# Patient Record
Sex: FEMALE | Race: BLACK OR AFRICAN AMERICAN | NOT HISPANIC OR LATINO | Employment: OTHER | URBAN - METROPOLITAN AREA
[De-identification: names, ages, dates, MRNs, and addresses within clinical notes are randomized per-mention and may not be internally consistent; named-entity substitution may affect disease eponyms.]

---

## 2017-10-24 ENCOUNTER — CONVERSION ENCOUNTER (OUTPATIENT)
Dept: MAMMOGRAPHY | Facility: HOSPITAL | Age: 77
End: 2017-10-24

## 2019-02-06 ENCOUNTER — HOSPITAL ENCOUNTER (OUTPATIENT)
Dept: MAMMOGRAPHY | Facility: HOSPITAL | Age: 79
Discharge: HOME | End: 2019-02-06
Attending: INTERNAL MEDICINE

## 2020-06-12 ENCOUNTER — HOSPITAL ENCOUNTER (OUTPATIENT)
Dept: MAMMOGRAPHY | Facility: HOSPITAL | Age: 80
Discharge: HOME | End: 2020-06-12
Attending: NURSE PRACTITIONER

## 2020-11-04 ENCOUNTER — CONVERSION ENCOUNTER (OUTPATIENT)
Dept: CARDIOLOGY | Facility: CLINIC | Age: 80
End: 2020-11-04

## 2020-11-04 ENCOUNTER — OFFICE VISIT CONVERTED (OUTPATIENT)
Dept: CARDIOLOGY | Facility: CLINIC | Age: 80
End: 2020-11-04
Attending: INTERNAL MEDICINE

## 2020-11-11 ENCOUNTER — HOSPITAL ENCOUNTER (OUTPATIENT)
Dept: ULTRASOUND IMAGING | Facility: HOSPITAL | Age: 80
Discharge: HOME | End: 2020-11-11
Attending: INTERNAL MEDICINE

## 2020-11-11 LAB
25(OH)D3 SERPL-MCNC: 38.6 NG/ML (ref 30–100)
ANION GAP SERPL CALC-SCNC: 14 MMOL/L (ref 8–19)
APPEARANCE UR: CLEAR
BASOPHILS # BLD AUTO: 0.03 10*3/UL (ref 0–0.2)
BASOPHILS NFR BLD AUTO: 0.6 % (ref 0–3)
BILIRUB UR QL: NEGATIVE
BUN SERPL-MCNC: 19 MG/DL (ref 5–25)
BUN/CREAT SERPL: 22 {RATIO} (ref 6–20)
CALCIUM SERPL-MCNC: 9.7 MG/DL (ref 8.7–10.4)
CHLORIDE SERPL-SCNC: 101 MMOL/L (ref 99–111)
COLOR UR: YELLOW
CONV ABS IMM GRAN: 0.01 10*3/UL (ref 0–0.2)
CONV BACTERIA: ABNORMAL
CONV CO2: 28 MMOL/L (ref 22–32)
CONV COLLECTION SOURCE (UA): ABNORMAL
CONV CREATININE URINE, RANDOM: 148.5 MG/DL (ref 10–300)
CONV HYALINE CASTS IN URINE MICRO: ABNORMAL /[LPF]
CONV IMMATURE GRAN: 0.2 % (ref 0–1.8)
CONV MICROALBUM.,U,RANDOM: 25.8 MG/L (ref 0–20)
CONV PROTEIN TO CREATININE RATIO (RANDOM URINE): 0.11 {RATIO} (ref 0–0.1)
CONV UROBILINOGEN IN URINE BY AUTOMATED TEST STRIP: 1 {EHRLICHU}/DL (ref 0.1–1)
CREAT UR-MCNC: 0.86 MG/DL (ref 0.5–0.9)
DEPRECATED RDW RBC AUTO: 40.2 FL (ref 36.4–46.3)
EOSINOPHIL # BLD AUTO: 0.21 10*3/UL (ref 0–0.7)
EOSINOPHIL # BLD AUTO: 4.1 % (ref 0–7)
ERYTHROCYTE [DISTWIDTH] IN BLOOD BY AUTOMATED COUNT: 12.7 % (ref 11.7–14.4)
EST. AVERAGE GLUCOSE BLD GHB EST-MCNC: 151 MG/DL
GFR SERPLBLD BASED ON 1.73 SQ M-ARVRAT: >60 ML/MIN/{1.73_M2}
GLUCOSE SERPL-MCNC: 118 MG/DL (ref 65–99)
GLUCOSE UR QL: NEGATIVE MG/DL
HBA1C MFR BLD: 6.9 % (ref 3.5–5.7)
HCT VFR BLD AUTO: 42.7 % (ref 37–47)
HGB BLD-MCNC: 14 G/DL (ref 12–16)
HGB UR QL STRIP: NEGATIVE
KETONES UR QL STRIP: NEGATIVE MG/DL
LEUKOCYTE ESTERASE UR QL STRIP: ABNORMAL
LYMPHOCYTES # BLD AUTO: 1.1 10*3/UL (ref 1–5)
LYMPHOCYTES NFR BLD AUTO: 21.4 % (ref 20–45)
MCH RBC QN AUTO: 28.6 PG (ref 27–31)
MCHC RBC AUTO-ENTMCNC: 32.8 G/DL (ref 33–37)
MCV RBC AUTO: 87.1 FL (ref 81–99)
MICROALBUMIN/CREAT UR: 17.4 MG/G{CRE} (ref 0–35)
MONOCYTES # BLD AUTO: 0.41 10*3/UL (ref 0.2–1.2)
MONOCYTES NFR BLD AUTO: 8 % (ref 3–10)
NEUTROPHILS # BLD AUTO: 3.37 10*3/UL (ref 2–8)
NEUTROPHILS NFR BLD AUTO: 65.7 % (ref 30–85)
NITRITE UR QL STRIP: NEGATIVE
NRBC CBCN: 0 % (ref 0–0.7)
OSMOLALITY SERPL CALC.SUM OF ELEC: 291 MOSM/KG (ref 273–304)
PH UR STRIP.AUTO: 7 [PH] (ref 5–8)
PHOSPHATE SERPL-MCNC: 2.9 MG/DL (ref 2.4–4.5)
PLATELET # BLD AUTO: 230 10*3/UL (ref 130–400)
PMV BLD AUTO: 10 FL (ref 9.4–12.3)
POTASSIUM SERPL-SCNC: 3.7 MMOL/L (ref 3.5–5.3)
PROT UR QL: NEGATIVE MG/DL
PROT UR-MCNC: 16 MG/DL
PTH-INTACT SERPL-MCNC: 23.5 PG/ML (ref 11.1–79.5)
RBC # BLD AUTO: 4.9 10*6/UL (ref 4.2–5.4)
RBC #/AREA URNS HPF: ABNORMAL /[HPF]
SODIUM SERPL-SCNC: 139 MMOL/L (ref 135–147)
SP GR UR: 1.02 (ref 1–1.03)
SQUAMOUS SPT QL MICRO: ABNORMAL /[HPF]
URATE SERPL-MCNC: 5.4 MG/DL (ref 2.5–7.5)
WBC # BLD AUTO: 5.13 10*3/UL (ref 4.8–10.8)
WBC #/AREA URNS HPF: ABNORMAL /[HPF]

## 2020-11-12 LAB
ALBUMIN SERPL-MCNC: 3.8 G/DL (ref 2.9–4.4)
ALBUMIN/GLOB SERPL: 1 {RATIO} (ref 0.7–1.7)
ALPHA2 GLOB SERPL ELPH-MCNC: 1 G/DL (ref 0.4–1)
BETA GLOBULIN: 1 G/DL (ref 0.7–1.3)
C3 SERPL-MCNC: 133 MG/DL (ref 82–167)
CH50 SERPL-ACNC: >60 U/ML
CONV ALPHA-1-GLOBULIN: 0.3 G/DL (ref 0–0.4)
CONV HEPATITIS B SURFACE AG W CONFIRMATION RE: NEGATIVE
CONV IMMUNOGLOBULIN G (IGG): 1411 MG/DL (ref 586–1602)
CONV IMMUNOGLOBULIN M (IGM): 209 MG/DL (ref 26–217)
CONV PE INTERPRETATION: NORMAL
CONV PE NOTE: NORMAL
CONV TOTAL PROTEIN: 7.5 G/DL (ref 6–8.5)
DSDNA AB SER-ACNC: NEGATIVE [IU]/ML
ENA AB SER IA-ACNC: NEGATIVE {RATIO}
FREE LIGHT CHAINS: 32.4 MG/L (ref 3.3–19.4)
GAMMA GLOB SERPL ELPH-MCNC: 1.5 G/DL (ref 0.4–1.8)
GLOBULIN UR ELPH-MCNC: 3.7 G/DL (ref 2.2–3.9)
HAV IGM SERPL QL IA: NEGATIVE
HBV CORE IGM SERPL QL IA: NEGATIVE
HCV AB SER DONR QL: <0.1 S/CO RATIO (ref 0–0.9)
IGA SERPL-MCNC: 181 MG/DL (ref 64–422)
KAPPA LC/LAMBDA SER: 1.88 {RATIO} (ref 0.26–1.65)
LAMBDA LC FREE SERPL-MCNC: 17.2 MG/L (ref 5.7–26.3)
M-SPIKE: NORMAL G/DL
PROT PATTERN SERPL IFE-IMP: NORMAL

## 2020-11-13 LAB — C4 SERPL-MCNC: 30 MG/DL (ref 12–38)

## 2020-11-25 ENCOUNTER — CONVERSION ENCOUNTER (OUTPATIENT)
Dept: CARDIOLOGY | Facility: CLINIC | Age: 80
End: 2020-11-25

## 2020-11-25 ENCOUNTER — OFFICE VISIT CONVERTED (OUTPATIENT)
Dept: CARDIOLOGY | Facility: CLINIC | Age: 80
End: 2020-11-25
Attending: INTERNAL MEDICINE

## 2020-12-04 ENCOUNTER — HOSPITAL ENCOUNTER (OUTPATIENT)
Dept: PREADMISSION TESTING | Facility: HOSPITAL | Age: 80
Discharge: HOME | End: 2020-12-04
Attending: INTERNAL MEDICINE

## 2020-12-06 LAB — SARS-COV-2 RNA SPEC QL NAA+PROBE: NOT DETECTED

## 2020-12-17 ENCOUNTER — HOSPITAL ENCOUNTER (OUTPATIENT)
Dept: CARDIOLOGY | Facility: HOSPITAL | Age: 80
Discharge: HOME | End: 2020-12-17
Attending: INTERNAL MEDICINE

## 2021-02-10 ENCOUNTER — HOSPITAL ENCOUNTER (OUTPATIENT)
Dept: VACCINE CLINIC | Facility: HOSPITAL | Age: 81
Discharge: HOME | End: 2021-02-10
Attending: INTERNAL MEDICINE

## 2021-03-11 ENCOUNTER — HOSPITAL ENCOUNTER (OUTPATIENT)
Dept: VACCINE CLINIC | Facility: HOSPITAL | Age: 81
Discharge: HOME | End: 2021-03-11
Attending: INTERNAL MEDICINE

## 2021-03-18 ENCOUNTER — HOSPITAL ENCOUNTER (OUTPATIENT)
Dept: OTHER | Facility: HOSPITAL | Age: 81
Discharge: HOME | End: 2021-03-18
Attending: INTERNAL MEDICINE

## 2021-03-18 LAB
ALBUMIN SERPL-MCNC: 4.2 G/DL (ref 3.5–5)
ALBUMIN/GLOB SERPL: 1.2 {RATIO} (ref 1.4–2.6)
ALP SERPL-CCNC: 68 U/L (ref 43–160)
ALT SERPL-CCNC: 27 U/L (ref 10–40)
ANION GAP SERPL CALC-SCNC: 14 MMOL/L (ref 8–19)
AST SERPL-CCNC: 27 U/L (ref 15–50)
BILIRUB SERPL-MCNC: 0.49 MG/DL (ref 0.2–1.3)
BUN SERPL-MCNC: 15 MG/DL (ref 5–25)
BUN/CREAT SERPL: 17 {RATIO} (ref 6–20)
CALCIUM SERPL-MCNC: 9.5 MG/DL (ref 8.7–10.4)
CHLORIDE SERPL-SCNC: 102 MMOL/L (ref 99–111)
CHOLEST SERPL-MCNC: 113 MG/DL (ref 107–200)
CHOLEST/HDLC SERPL: 2.1 {RATIO} (ref 3–6)
CONV CO2: 28 MMOL/L (ref 22–32)
CONV TOTAL PROTEIN: 7.7 G/DL (ref 6.3–8.2)
CREAT UR-MCNC: 0.87 MG/DL (ref 0.5–0.9)
GFR SERPLBLD BASED ON 1.73 SQ M-ARVRAT: >60 ML/MIN/{1.73_M2}
GLOBULIN UR ELPH-MCNC: 3.5 G/DL (ref 2–3.5)
GLUCOSE SERPL-MCNC: 118 MG/DL (ref 65–99)
HDLC SERPL-MCNC: 54 MG/DL (ref 40–60)
LDLC SERPL CALC-MCNC: 44 MG/DL (ref 70–100)
OSMOLALITY SERPL CALC.SUM OF ELEC: 292 MOSM/KG (ref 273–304)
POTASSIUM SERPL-SCNC: 3.9 MMOL/L (ref 3.5–5.3)
SODIUM SERPL-SCNC: 140 MMOL/L (ref 135–147)
TRIGL SERPL-MCNC: 73 MG/DL (ref 40–150)
VLDLC SERPL-MCNC: 15 MG/DL (ref 5–37)

## 2021-03-25 ENCOUNTER — OFFICE VISIT CONVERTED (OUTPATIENT)
Dept: CARDIOLOGY | Facility: CLINIC | Age: 81
End: 2021-03-25
Attending: INTERNAL MEDICINE

## 2021-05-10 NOTE — H&P
History and Physical      Patient Name: Rosalia Gilmore   Patient ID: [id number]   Sex: Female   YOB: 1940    Primary Care Provider: Chriss Liu MD    Visit Date: November 4, 2020    Provider: Keisha Darling MD   Location: [facility]   Location Address: [address] [place] [address], [address]   [place]  [id number]   Location Phone: [phone]          Chief Complaint     Evaluate hypertension.       History Of Present Illness  Consult requested by: Chriss Liu MD   Rosalia Gilmore is an 80 year old /Black female who has not been in the office in over 5.5 years now. She comes in because of increasing blood pressure. She denies any chest pain or pressure. No palpitations. She does have swelling, long-term, in her ankles, and is mild and unchanged. She does complain of shortness of breath when she goes up stairs, which she has to do frequency, and she thinks that is slightly new. She has occasional dizziness when her blood pressure goes too low, but no syncope, PND, or orthopnea.   PAST MEDICAL HISTORY: Arthritis; Diabetes mellitus; Heart murmur; Hypertension.   PAST HOSPITALIZATIONS/SURGERIES: Hysterectomy; Parotidectomy; Nasal polyps; Cataract removal bilateral eye.   FAMILY HISTORY: Positive for diabetes mellitus and hypertension. Negative for heart disease.   CURRENT MEDICATIONS: Hydrochlorothiazide 25 mg daily; Oscal with vitamin D 500 b.i.d.; Zocor 20 mg daily; Prilosec 20 mg daily; Glucophage  mg daily; aspirin 81 mg daily; carvedilol 12.5 mg one in the morning and two at night; Vesicare 5 mg daily; losartan 100 mg q. h.s.; clonidine 0.1 mg b.i.d. She started about two months ago at one tab and it was raised a couple of weeks ago by her nephrologist. She was also on both lisinopril and losartan, and the lisinopril was stopped a couple of weeks ago by the nephrologist. She also takes multivitamins.   CHOLESTEROL STATUS: Labs taken in August, BUN was 17, creatinine  "0.9, hemoglobin A1c 5.9%, total cholesterol 102, LDL 53, HDL 48, triglycerides 74.   PSYCHOSOCIAL HISTORY: Denies any changes in mood or depression. She is . Drinks one coffee and one tea a day for caffeine intake. Rarely has any alcohol. Never used any tobacco or tobacco products. She walks around the yard for exercise.   ALLERGIES: CODEINE; PENICILLIN.       Review of Systems  · Constitutional  o Admits  o : good general health lately  o Denies  o : fatigue, recent weight changes   · Eyes  o Denies  o : blurred vision  · HENT  o Denies  o : hearing loss or ringing, chronic sinus problem, swollen glands in neck  · Cardiovascular  o Admits  o : swelling (feet, ankles, hands), shortness of breath with activities  o Denies  o : chest pain, palpitations (fast, fluttering, or skipping beats)  · Respiratory  o Denies  o : shortness of breath, asthma or wheezing, COPD  · Gastrointestinal  o Denies  o : ulcers, nausea or vomiting  · Neurologic  o Admits  o : lightheaded or dizzy, headaches  o Denies  o : stroke  · Musculoskeletal  o Admits  o : joint pain, back pain  · Endocrine  o Admits  o : diabetes  o Denies  o : thyroid disease, heat or cold intolerance, excessive thirst or urination  · Heme-Lymph  o Denies  o : bleeding or bruising tendency, anemia      Vitals  Date Time BP Position Site L\R Cuff Size HR RR TEMP (F) WT  HT  BMI kg/m2 BSA m2 O2 Sat FR L/min FiO2 HC       11/04/2020 01:02 /86 Sitting    72 - R   175lbs 16oz 5'  8\" 26.76 1.96       11/04/2020 01:02 /76 Sitting                       Physical Examination  · Constitutional  o Appearance  o : Awake, alert, in no acute distress, somewhat hard-of-hearing.   · Eyes  o Conjunctivae  o : Conjunctivae normal.  o Pupils and Irises  o : Fundoscopic exam not done. Pupils equal and reactive to light.   · Ears, Nose, Mouth and Throat  o Oral Cavity  o :   § Oral Mucosa  § : Normal oral mucosa.  · Neck  o Inspection/Palpation  o : No JVD. No bruits " noted. No lymphadenopathy. Good carotid upstroke.  · Respiratory  o Respiratory  o : Good respiratory effort. Clear to percussion and auscultation.  · Cardiovascular  o Heart  o : PMI is normal. S1, S2 regular. No S3. Positive S4. 1-2/6 systolic murmur at the apex. Negative diastolic murmur.   o Peripheral Vascular System  o :   § Extremities  § : Good femoral and pedal pulses. No pedal edema.  · Gastrointestinal  o Abdominal Examination  o : Soft. No masses or tenderness felt. No hepatosplenomegaly. Abdominal aorta is not palpable.  · Musculoskeletal  o General  o : Muscle strength is normal with normal tone.  · Skin and Subcutaneous Tissue  o General Inspection  o : Within normal limits.  · EKG  o EKG  o : Performed in the office today.  o Indications  o : Uncontrolled hypertension.   o Results  o : She is in sinus rhythm at a rate of 70 with a right bundle branch block.   o Comparison  o : No recent EKG to compare to.           Assessment     1.  Hypertension, needs tighter control.  2.  Pedal edema, mild.  3.  Shortness of breath.  4.  Hyperlipidemia, at goal.  5.  Diabetes mellitus, controlled.            Plan     1.  Increase carvedilol to 12.5 mg two tabs b.i.d. with the current bottle, and when completed, she will go       to the 25 mg b.i.d. prescription.    2.  Take losartan in the morning.  3.  Decrease clonidine to 0.1 mg one tab at night.  4.  She will do a blood pressure log, starting next week, for two weeks, and we will adjust hypertensive        medications if needed.    5.  Follow up in 3-4 weeks with an echocardiogram.        CATY Denton/Keisha Darling MD, [facility]  JF:PM:vm                      Electronically Signed by: Jenn Smith-, Other -Author on November 10, 2020 06:40:29 AM  Electronically Co-signed by: CATY Johnson -Reviewer on November 10, 2020 08:37:58 AM  Electronically Co-signed by: Keisha Darling MD -Reviewer on November 14, 2020 10:18:59 AM

## 2021-05-10 NOTE — PROCEDURES
"   Procedure Note      Patient Name: Rosalia Gilmore   Patient ID: [id number]   Sex: Female   YOB: 1940    Primary Care Provider: Chriss Liu MD    Visit Date: November 25, 2020    Provider: Keisha Darling MD   Location: [facility]   Location Address: [address] [place] [address], [address]   [place]  [id number]   Location Phone: [phone]          FINAL REPORT   TRANSTHORACIC ECHOCARDIOGRAM REPORT    Diagnosis: Shortness of breath   Height: 5'8\" Weight: 176 B/P: 146/76 BSA: 1.9   Tech: BNS   MEASUREMENTS:  RVID (Diastole) : RVID. (NORMAL: 0.7 to 2.4 cm max)   LVID (Systole): 3.7 cm (Diastole): 5.0 cm . (NORMAL: 3.7 - 5.4 cm)   Posterior Wall Thickness (Diastole): 0.7 cm. (NORMAL: 0.8 - 1.1 cm)   Septal Thickness (Diastole): 1.3 cm. (NORMAL: 0.7 - 1.2 cm)   LAID (Systole): 4.0 cm. (NORMAL: 1.9 - 3.8 cm)   Aortic Root Diameter (Diastole): 3.4 cm. (NORMAL: 2.0 - 3.7 cm)   COMMENTS:  The patient underwent 2-D, M-Mode, and Doppler examination, including pulse-wave, continuous-wave, and color-flow analysis; the study is technically adequate.   FINDINGS:  MITRAL VALVE: Normal. E to F slope was normal. No evidence of any prolapse.   AORTIC VALVE: Normal with three cusps. Normal central closure. No evidence of any obstruction.   TRICUSPID VALVE: Normal.   PULMONIC VALVE: Normal.   LEFT ATRIUM: Normal; no masses seen. LA volume is 24 mL/m2.   AORTIC ROOT: Normal in size and motion.   LEFT VENTRICLE: The left ventricular chamber size is normal. The left ventricular wall thickness is increased with mild septal hypertrophy present. The overall left ventricular systolic function is normal with an estimated EF of 55-60%. No significant regional wall motion abnormalities are identified. There is a suggestion of a subvalvular small ventricular septal defect which appears to be a supracristal VSD.   RIGHT ATRIUM: Normal.   RIGHT VENTRICLE: Normal size and function.   PERICARDIUM: No effusion.   INFERIOR VENA " CAVA: Diameter is 1.9 cm with greater than 50% reduction with inspiration.   DOPPLER: Pulse-wave, continuous wave, and color-flow Doppler evaluation was performed. E/A ratio is 0.6. DT= 183 msec. IVRT is 88 msec. E/E' is 12. There is suggestion of flow between the left ventricle and right ventricle suggestive of a small subaortic VSD.   Faxed: 12/01/2020      CONCLUSION:  1.  Left ventricular chamber size is normal. The left ventricular wall thickness is increased with mild septal        hypertrophy present. The overall left ventricular systolic function is normal with an estimated EF of 55-60%.        No significant regional wall motion abnormalities are identified. There is a suggestion of a subvalvular small        ventricular septal defect which appears to be a supracristal VSD.  2.  Grade 1 left ventricular diastolic dysfunction.   3.  Suggestion of a subaortic small VSD.      Keisha Darling MD, FACC  PM/pap    This note was transcribed by Yu Gómez.  pap/pm  The above service was transcribed by Yu Gómez, and I attest to the accuracy of the note.  PM                 Electronically Signed by: Carmencita Gómez-, Other -Author on December 1, 2020 09:18:31 AM  Electronically Co-signed by: Keisha Darling MD -Reviewer on December 1, 2020 12:26:50 PM

## 2021-05-13 NOTE — PROGRESS NOTES
"   Progress Note      Patient Name: Rosalia Gilmore   Patient ID: [id number]   Sex: Female   YOB: 1940    Primary Care Provider: Chriss Liu MD    Visit Date: November 25, 2020    Provider: Keisha Darling MD   Location: [facility]   Location Address: [address] [place] [address], [address]   [place]  [id number]   Location Phone: [phone]          Chief Complaint  · Followup of hypertension and hyperlipidemia       History Of Present Illness  REFERRING PROVIDER: Chriss Liu MD   Rosalia Gilmore is a 80-year-old female with hypertension and hyperlipidemia who is feeling well. She denies any chest pain, shortness of breath, PND, orthopnea, palpitations, dizziness, or syncope.   PAST MEDICAL HISTORY: Arthritis; diabetes mellitus; heart murmur, hypertension.   PSYCHOSOCIAL HISTORY: Rare alcohol use.   CURRENT MEDICATIONS: HCTZ 25 mg daily; Os-Olvin/vitamin D 500 mg b.i.d.; Zocor 20 mg daily; Prilosec 20 mg daily; Glucophage 500 mg daily; aspirin 81 mg daily; Vesicare 5 mg daily; Losartan 100 mg daily; Clonidine 0.1 mg daily; Carvedilol 12.5 mg b.i.d.       Review of Systems  · Cardiovascular  o Denies  o : palpitations (fast, fluttering, or skipping beats), swelling (feet, ankles, hands), shortness of breath while walking or lying flat, chest pain or angina pectoris   · Respiratory  o Denies  o : chronic or frequent cough      Vitals  Date Time BP Position Site L\R Cuff Size HR RR TEMP (F) WT  HT  BMI kg/m2 BSA m2 O2 Sat FR L/min FiO2        11/25/2020 08:33 /90 Sitting    74 - R   173lbs 0oz 5'  8\" 26.3 1.94              Blood pressure log looks excellent.       Physical Examination  · Constitutional  o Appearance  o : Awake, alert, in no acute distress.  · Eyes  o Conjunctivae  o : Conjunctivae normal.  · Ears, Nose, Mouth and Throat  o Oral Cavity  o :   § Oral Mucosa  § : Normal.  · Neck  o Inspection/Palpation/Auscultation  o : No lymphadenopathy. No JVD. No bruit. Good carotid " upstroke.  · Respiratory  o Respiratory  o : Clear to percussion and auscultation. Good respiratory effort.  · Cardiovascular  o Heart  o : PMI is normal. S1, S2 regular. No S3. Positive S4. 1-2/6 systolic murmur at the apex. Negative diastolic murmur.  o Peripheral Vascular System  o :   § Extremities  § : Good femoral and pedal pulses. No pedal edema.  · Gastrointestinal  o Abdominal Examination  o : Soft. No masses or tenderness felt. No hepatosplenomegaly. Abdominal aorta is not palpable.  · Labs  o Labs  o : BMP is normal. HDL 59,LDL 46, triglyceride 84.     Echocardiogram results were reviewed with the patient, suggestive of a subaortic VSD.           Assessment     1.  Hypertension, controlled.   2.  Hyperlipidemia, at goal.   3.  High likelihood of subaortic VSD.       Plan     Discussed with her options regarding MRI of the heart to determine congenital abnormality that we have seen on her echocardiogram.  However, she does not want to go to [place]. I am therefore going to schedule her for a transesophageal echocardiogram.  Indications and risks of the procedure have been explained to the patient.       Keisha Darling MD, [facility]  PM/pap    This note was transcribed by Yu Gómez.  pap/pm  The above service was transcribed by Yu Gómez, and I attest to the accuracy of the note.  PM             Electronically Signed by: Carmencita Gómez-, Other -Author on December 1, 2020 02:51:07 PM  Electronically Co-signed by: Keisha Darling MD -Reviewer on December 2, 2020 08:51:30 AM

## 2021-05-14 VITALS
DIASTOLIC BLOOD PRESSURE: 82 MMHG | HEART RATE: 72 BPM | WEIGHT: 168 LBS | BODY MASS INDEX: 25.46 KG/M2 | HEIGHT: 68 IN | SYSTOLIC BLOOD PRESSURE: 176 MMHG

## 2021-05-14 VITALS
DIASTOLIC BLOOD PRESSURE: 90 MMHG | HEIGHT: 68 IN | HEART RATE: 74 BPM | WEIGHT: 173 LBS | SYSTOLIC BLOOD PRESSURE: 174 MMHG | BODY MASS INDEX: 26.22 KG/M2

## 2021-05-14 VITALS
BODY MASS INDEX: 26.67 KG/M2 | HEIGHT: 68 IN | SYSTOLIC BLOOD PRESSURE: 158 MMHG | HEART RATE: 72 BPM | WEIGHT: 176 LBS | DIASTOLIC BLOOD PRESSURE: 86 MMHG

## 2021-05-14 NOTE — PROGRESS NOTES
"   Progress Note      Patient Name: Rosalia Gilmore   Patient ID: [id number]   Sex: Female   YOB: 1940    Primary Care Provider: Chriss Liu MD    Visit Date: March 25, 2021    Provider: Keisha Darling MD   Location: [facility]   Location Address: [address] [place] [address], [address]   [place]  [id number]   Location Phone: [phone]          Chief Complaint     Follow up of hypertension and abnormal echocardiogram.       History Of Present Illness  REFERRING PROVIDER: Chriss Liu MD   Rosalia Gilmore is an 80 year old /Black female with hypertension, hyperlipidemia, and suspicion for possible VSD who is back for a followup. She feels very well. She denies any chest pain, palpitations, shortness of breath, dizziness, or syncope.   PAST MEDICAL HISTORY: Arthritis; Diabetes mellitus; Heart murmur; Hypertension.   PSYCHOSOCIAL HISTORY: Denies tobacco use. Rarely consumes alcohol.   CURRENT MEDICATIONS: Medication list reviewed and as documented.      ALLERGIES: Codeine; penicillin.       Review of Systems  · Cardiovascular  o Denies  o : palpitations (fast, fluttering, or skipping beats), swelling (feet, ankles, hands), shortness of breath while walking or lying flat, chest pain or angina pectoris   · Respiratory  o Denies  o : chronic or frequent cough      Vitals  Date Time BP Position Site L\R Cuff Size HR RR TEMP (F) WT  HT  BMI kg/m2 BSA m2 O2 Sat FR L/min FiO2 HC       03/25/2021 10:47 /82 Sitting    72 - R   168lbs 0oz 5'  8\" 25.54 1.91       03/25/2021 10:47 /78 Sitting                        Her blood pressure log looks much better than the office readings.       Physical Examination  · Constitutional  o Appearance  o : Awake, alert, in no acute distress.  · Respiratory  o Respiratory  o : Clear to percussion and auscultation. Good respiratory effort.  · Cardiovascular  o Heart  o : PMI is normal. S1, S2 regular. No S3. Positive S4. 1-2/6 systolic murmur " at the apex. Negative diastolic murmur.   o Peripheral Vascular System  o :   § Extremities  § : Good femoral and pedal pulses. No pedal edema.  · Gastrointestinal  o Abdominal Examination  o : Soft. No masses or tenderness felt. No hepatosplenomegaly. Abdominal aorta is not palpable.  · Labs  o Labs  o : Chemistry panel is normal. HDL 54, LDL 44, triglycerides 73.          Assessment     1.  Hypertension, controlled based on home blood pressure readings.  2.  Hyperlipidemia, at goal.  3.  Diabetes mellitus, type 2 without complication.  4.  No evidence of VSD on transesophageal echocardiogram.       Plan     1.  Continue current medications.  2.  Continue home blood pressure monitoring.  3.  Follow up in 1 year or earlier if necessary.        Keisha Darling MD, [facility]  PM:vm             Electronically Signed by: Jenn Smith-, Other -Author on April 7, 2021 08:27:51 PM  Electronically Co-signed by: Keisha Darling MD -Reviewer on April 20, 2021 12:45:15 PM

## 2021-05-24 ENCOUNTER — TRANSCRIBE ORDERS (OUTPATIENT)
Dept: ADMINISTRATIVE | Facility: HOSPITAL | Age: 81
End: 2021-05-24

## 2021-05-24 DIAGNOSIS — Z12.39 SCREENING BREAST EXAMINATION: Primary | ICD-10-CM

## 2021-06-01 ENCOUNTER — HOSPITAL ENCOUNTER (OUTPATIENT)
Dept: MAMMOGRAPHY | Facility: HOSPITAL | Age: 81
Discharge: HOME | End: 2021-06-01
Attending: INTERNAL MEDICINE

## 2021-06-22 ENCOUNTER — HOSPITAL ENCOUNTER (OUTPATIENT)
Dept: MAMMOGRAPHY | Facility: HOSPITAL | Age: 81
Discharge: HOME | End: 2021-06-22
Admitting: INTERNAL MEDICINE

## 2021-06-22 DIAGNOSIS — Z12.39 SCREENING BREAST EXAMINATION: ICD-10-CM

## 2021-06-22 PROCEDURE — 77067 SCR MAMMO BI INCL CAD: CPT | Performed by: RADIOLOGY

## 2021-06-22 PROCEDURE — 77067 SCR MAMMO BI INCL CAD: CPT

## 2021-06-22 PROCEDURE — 77063 BREAST TOMOSYNTHESIS BI: CPT

## 2021-06-22 PROCEDURE — 77063 BREAST TOMOSYNTHESIS BI: CPT | Performed by: RADIOLOGY

## 2021-07-23 ENCOUNTER — APPOINTMENT (OUTPATIENT)
Dept: CT IMAGING | Facility: HOSPITAL | Age: 81
End: 2021-07-23

## 2021-07-23 ENCOUNTER — HOSPITAL ENCOUNTER (EMERGENCY)
Facility: HOSPITAL | Age: 81
Discharge: HOME | End: 2021-07-23
Attending: EMERGENCY MEDICINE | Admitting: EMERGENCY MEDICINE

## 2021-07-23 VITALS
DIASTOLIC BLOOD PRESSURE: 93 MMHG | HEIGHT: 63 IN | RESPIRATION RATE: 17 BRPM | HEART RATE: 81 BPM | SYSTOLIC BLOOD PRESSURE: 176 MMHG | BODY MASS INDEX: 29.88 KG/M2 | WEIGHT: 168.65 LBS | TEMPERATURE: 97.2 F | OXYGEN SATURATION: 98 %

## 2021-07-23 DIAGNOSIS — S00.93XA TRAUMATIC HEMATOMA OF HEAD, INITIAL ENCOUNTER: ICD-10-CM
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
## 2021-07-23 PROCEDURE — 70450 CT HEAD/BRAIN W/O DYE: CPT

## 2021-07-23 PROCEDURE — 99283 EMERGENCY DEPT VISIT LOW MDM: CPT

## 2021-07-23 RX ORDER — ACETAMINOPHEN 325 MG/1
975 TABLET ORAL ONCE
Status: COMPLETED | OUTPATIENT
Start: 2021-07-23 | End: 2021-07-23

## 2021-07-23 RX ADMIN — ACETAMINOPHEN 975 MG: 325 TABLET ORAL at 16:31

## 2021-07-23 NOTE — ED PROVIDER NOTES
Subjective     History provided by:  Patient   used: No    Fall  Mechanism of injury: fall    Injury location:  Head/neck  Head/neck injury location:  Head  Incident location: tripped in [facility] parking lot.  Time since incident:  30 minutes  Arrived directly from scene: yes    Fall:     Fall occurred:  Tripped    Point of impact:  Head      Review of Systems   Constitutional: Negative for appetite change, chills, fatigue and fever.   HENT: Negative.    Eyes: Negative.  Negative for photophobia.   Respiratory: Negative.    Gastrointestinal: Negative.    Endocrine: Negative.    Genitourinary: Negative.    Musculoskeletal: Negative.    Skin: Negative.    Allergic/Immunologic: Negative.  Negative for immunocompromised state.   Neurological: Negative.    Hematological: Negative.    Psychiatric/Behavioral: Negative.    All other systems reviewed and are negative.      History reviewed. No pertinent past medical history.    Allergies   Allergen Reactions   • Penicillins Anaphylaxis   • Codeine Itching       History reviewed. No pertinent surgical history.    History reviewed. No pertinent family history.    Social History     Socioeconomic History   • Marital status:      Spouse name: Not on file   • Number of children: Not on file   • Years of education: Not on file   • Highest education level: Not on file           Objective   Physical Exam  Vitals and nursing note reviewed.   Constitutional:       General: She is not in acute distress.     Appearance: Normal appearance. She is not toxic-appearing.   HENT:      Head: Normocephalic. Abrasion present. No raccoon eyes or Mills's sign.        Comments: No cervical spinous process tenderness     Mouth/Throat:      Mouth: Mucous membranes are moist.   Eyes:      Extraocular Movements: Extraocular movements intact.      Pupils: Pupils are equal, round, and reactive to light.   Cardiovascular:      Rate and Rhythm: Normal rate and regular rhythm.       Pulses: Normal pulses.      Heart sounds: Normal heart sounds.   Pulmonary:      Effort: Pulmonary effort is normal. No respiratory distress.      Breath sounds: Normal breath sounds.   Abdominal:      General: Abdomen is flat.      Palpations: Abdomen is soft.      Tenderness: There is no abdominal tenderness.   Musculoskeletal:         General: Normal range of motion.      Cervical back: Normal range of motion and neck supple.        Feet:    Skin:     General: Skin is warm and dry.   Neurological:      General: No focal deficit present.      Mental Status: She is alert and oriented to person, place, and time. Mental status is at baseline.   Psychiatric:         Mood and Affect: Mood normal.         Behavior: Behavior normal.         Thought Content: Thought content normal.         Judgment: Judgment normal.         Procedures           ED Course                                           MDM  Number of Diagnoses or Management Options  Diagnosis management comments: Pt is a 80 y.o. female that presents today with Patient presents with:  Fall       Work up today:  Lab Results (last 24 hours)     ** No results found for the last 24 hours. **      CT Head Without Contrast    Result Date: 7/23/2021  PROCEDURE: CT HEAD WO CONTRAST  COMPARISON:  [facility], CT, HEAD W/O CONTRAST, 4/27/2014, 11:32. INDICATIONS: fall hit head  PROTOCOL:   Standard imaging protocol performed    RADIATION:      MA and/or KV was adjusted to minimize radiation dose.     TECHNIQUE: After obtaining the patient's consent, CT images were obtained without non-ionic intravenous contrast material.  FINDINGS:  There are white matter changes involving both cerebral hemispheres which could reflect more chronic small vessel ischemic change.  There is cerebellar atrophy.  There is a scalp hematoma along the calvarium in the frontal area measuring 3.4 x 1.6 cm.  There is no definite orbital abnormality.  The paranasal sinuses seem clear.  The  patient has had bilateral maxillary antrostomies.  CONCLUSION:  1. White matter changes which could reflect more chronic small vessel ischemic change. 2. Large scalp hematoma along the calvarium in the frontal area 3. There is some cerebellar atrophy.     ULISES BARBA MD       Electronically Signed and Approved By: ULISES BARBA MD on 7/23/2021 at 16:04              @No orders to display     Pt is otherwise non toxic and non ill appearing and stable for d/c home.  Pt is in agreement with this.  All questions answered at bedside.          Amount and/or Complexity of Data Reviewed  Tests in the radiology section of CPT®: reviewed    Risk of Complications, Morbidity, and/or Mortality  Presenting problems: low  Diagnostic procedures: low  Management options: low    Patient Progress  Patient progress: stable      Final diagnoses:   Fall, initial encounter   Traumatic hematoma of head, initial encounter       ED Disposition  ED Disposition     ED Disposition Condition Comment    Discharge Stable           Chriss Liu MD  [address] [facility] [address]  [facility] [address]  [phone]    Schedule an appointment as soon as possible for a visit   If symptoms worsen         Medication List      No changes were made to your prescriptions during this visit.          Corwin Perez PA  07/23/21 [id number]

## 2021-07-23 NOTE — DISCHARGE INSTRUCTIONS
May also ice your head every 4 hours for 15 minutes without letting ice touch skin directly for next 2 days  Tylenol for pain as directed on box

## 2021-08-10 ENCOUNTER — TRANSCRIBE ORDERS (OUTPATIENT)
Dept: ADMINISTRATIVE | Facility: HOSPITAL | Age: 81
End: 2021-08-10

## 2021-08-10 DIAGNOSIS — R92.8 ABNORMAL MAMMOGRAM: Primary | ICD-10-CM

## 2021-09-24 ENCOUNTER — HOSPITAL ENCOUNTER (OUTPATIENT)
Dept: ULTRASOUND IMAGING | Facility: HOSPITAL | Age: 81
Discharge: HOME | End: 2021-09-24

## 2021-09-24 ENCOUNTER — HOSPITAL ENCOUNTER (OUTPATIENT)
Dept: MAMMOGRAPHY | Facility: HOSPITAL | Age: 81
Discharge: HOME | End: 2021-09-24

## 2021-09-24 DIAGNOSIS — R92.8 ABNORMAL MAMMOGRAM: ICD-10-CM

## 2021-09-24 PROCEDURE — 77066 DX MAMMO INCL CAD BI: CPT

## 2021-09-24 PROCEDURE — G0279 TOMOSYNTHESIS, MAMMO: HCPCS

## 2022-03-25 ENCOUNTER — LAB (OUTPATIENT)
Dept: LAB | Facility: HOSPITAL | Age: 82
End: 2022-03-25

## 2022-03-25 ENCOUNTER — TRANSCRIBE ORDERS (OUTPATIENT)
Dept: ADMINISTRATIVE | Facility: HOSPITAL | Age: 82
End: 2022-03-25

## 2022-03-25 DIAGNOSIS — Z79.899 ENCOUNTER FOR LONG-TERM (CURRENT) USE OF OTHER MEDICATIONS: ICD-10-CM

## 2022-03-25 DIAGNOSIS — E78.5 HYPERLIPIDEMIA, UNSPECIFIED HYPERLIPIDEMIA TYPE: Primary | ICD-10-CM

## 2022-03-25 DIAGNOSIS — E78.5 HYPERLIPIDEMIA, UNSPECIFIED HYPERLIPIDEMIA TYPE: ICD-10-CM

## 2022-03-25 LAB
ALBUMIN SERPL-MCNC: 4.6 G/DL (ref 3.5–5.2)
ALBUMIN/GLOB SERPL: 1.5 G/DL
ALP SERPL-CCNC: 56 U/L (ref 39–117)
ALT SERPL W P-5'-P-CCNC: 19 U/L (ref 1–33)
ANION GAP SERPL CALCULATED.3IONS-SCNC: 11.4 MMOL/L (ref 5–15)
AST SERPL-CCNC: 19 U/L (ref 1–32)
BILIRUB SERPL-MCNC: 0.7 MG/DL (ref 0–1.2)
BUN SERPL-MCNC: 16 MG/DL (ref 8–23)
BUN/CREAT SERPL: 18.4 (ref 7–25)
CALCIUM SPEC-SCNC: 9.9 MG/DL (ref 8.6–10.5)
CHLORIDE SERPL-SCNC: 102 MMOL/L (ref 98–107)
CHOLEST SERPL-MCNC: 119 MG/DL (ref 0–200)
CO2 SERPL-SCNC: 28.6 MMOL/L (ref 22–29)
CREAT SERPL-MCNC: 0.87 MG/DL (ref 0.57–1)
EGFRCR SERPLBLD CKD-EPI 2021: 67 ML/MIN/1.73
GLOBULIN UR ELPH-MCNC: 3 GM/DL
GLUCOSE SERPL-MCNC: 117 MG/DL (ref 65–99)
HDLC SERPL-MCNC: 60 MG/DL (ref 40–60)
LDLC SERPL CALC-MCNC: 46 MG/DL (ref 0–100)
LDLC/HDLC SERPL: 0.8 {RATIO}
POTASSIUM SERPL-SCNC: 4.1 MMOL/L (ref 3.5–5.2)
PROT SERPL-MCNC: 7.6 G/DL (ref 6–8.5)
SODIUM SERPL-SCNC: 142 MMOL/L (ref 136–145)
T4 FREE SERPL-MCNC: 1.26 NG/DL (ref 0.93–1.7)
TRIGL SERPL-MCNC: 56 MG/DL (ref 0–150)
TSH SERPL DL<=0.05 MIU/L-ACNC: 1.88 UIU/ML (ref 0.27–4.2)
VLDLC SERPL-MCNC: 13 MG/DL (ref 5–40)

## 2022-03-25 PROCEDURE — 80061 LIPID PANEL: CPT

## 2022-03-25 PROCEDURE — 84443 ASSAY THYROID STIM HORMONE: CPT

## 2022-03-25 PROCEDURE — 80053 COMPREHEN METABOLIC PANEL: CPT

## 2022-03-25 PROCEDURE — 84439 ASSAY OF FREE THYROXINE: CPT

## 2022-03-25 PROCEDURE — 36415 COLL VENOUS BLD VENIPUNCTURE: CPT

## 2022-03-28 PROBLEM — I10 ESSENTIAL HYPERTENSION: Chronic | Status: ACTIVE | Noted: 2022-03-28

## 2022-03-28 PROBLEM — E78.5 HYPERLIPIDEMIA: Chronic | Status: ACTIVE | Noted: 2022-03-28

## 2022-03-28 PROBLEM — I10 ESSENTIAL HYPERTENSION: Status: ACTIVE | Noted: 2022-03-28

## 2022-03-28 PROBLEM — E78.5 HYPERLIPIDEMIA: Status: ACTIVE | Noted: 2022-03-28

## 2022-03-31 ENCOUNTER — TRANSCRIBE ORDERS (OUTPATIENT)
Dept: CARDIOLOGY | Facility: CLINIC | Age: 82
End: 2022-03-31

## 2022-03-31 DIAGNOSIS — Z79.899 ENCOUNTER FOR LONG-TERM (CURRENT) USE OF OTHER MEDICATIONS: ICD-10-CM

## 2022-03-31 DIAGNOSIS — E78.5 HYPERLIPIDEMIA, UNSPECIFIED HYPERLIPIDEMIA TYPE: Primary | ICD-10-CM

## 2022-04-01 ENCOUNTER — OFFICE VISIT (OUTPATIENT)
Dept: CARDIOLOGY | Facility: CLINIC | Age: 82
End: 2022-04-01

## 2022-04-01 VITALS
BODY MASS INDEX: 28.7 KG/M2 | HEART RATE: 77 BPM | HEIGHT: 63 IN | WEIGHT: 162 LBS | SYSTOLIC BLOOD PRESSURE: 146 MMHG | DIASTOLIC BLOOD PRESSURE: 77 MMHG

## 2022-04-01 DIAGNOSIS — I10 ESSENTIAL HYPERTENSION: Primary | Chronic | ICD-10-CM

## 2022-04-01 DIAGNOSIS — E78.5 HYPERLIPIDEMIA, UNSPECIFIED HYPERLIPIDEMIA TYPE: Chronic | ICD-10-CM

## 2022-04-01 PROCEDURE — 99214 OFFICE O/P EST MOD 30 MIN: CPT | Performed by: NURSE PRACTITIONER

## 2022-04-01 RX ORDER — HYDROCHLOROTHIAZIDE 25 MG/1
TABLET ORAL
COMMUNITY
Start: 2022-03-08

## 2022-04-01 RX ORDER — CARVEDILOL 25 MG/1
25 TABLET ORAL 2 TIMES DAILY WITH MEALS
COMMUNITY
Start: 2022-01-31

## 2022-04-01 RX ORDER — UREA 10 %
LOTION (ML) TOPICAL
COMMUNITY

## 2022-04-01 RX ORDER — OMEPRAZOLE 20 MG/1
20 CAPSULE, DELAYED RELEASE ORAL DAILY
COMMUNITY
Start: 2022-02-09

## 2022-04-01 RX ORDER — SIMVASTATIN 20 MG
20 TABLET ORAL NIGHTLY
COMMUNITY
Start: 2022-02-09

## 2022-04-01 RX ORDER — ASPIRIN 81 MG/1
TABLET, COATED ORAL
COMMUNITY
Start: 2022-01-31

## 2022-04-01 RX ORDER — METFORMIN HYDROCHLORIDE 500 MG/1
TABLET, EXTENDED RELEASE ORAL EVERY 24 HOURS
COMMUNITY

## 2022-04-01 RX ORDER — CLONIDINE HYDROCHLORIDE 0.1 MG/1
0.1 TABLET ORAL 2 TIMES DAILY
COMMUNITY

## 2022-04-01 RX ORDER — SOLIFENACIN SUCCINATE 5 MG/1
TABLET, FILM COATED ORAL EVERY 24 HOURS
COMMUNITY
End: 2022-04-01

## 2022-04-01 NOTE — PROGRESS NOTES
Chief Complaint  Hypertension and Hyperlipidemia (1 year follow up)    Subjective        Rosalia Gilmore presents to [facility] CARDIOLOGY  He is an 81-year-old female comes in to evaluate her hypertension and hyperlipidemia. Denies any chest pains, shortness of breath, palpitations, dizziness, syncope, swelling, PND, or orthopnea.  Cardiac wise she has no complaints.  She monitors her blood pressures at home states they read between 120-135 systolically.  Overall she is feeling good with no complaints.        Past History:    Past Medical History:   Diagnosis Date   • Essential hypertension 3/28/2022   • Hyperlipidemia 3/28/2022        Family History: family history is not on file.     Social History: reports that she has never smoked. She has never used smokeless tobacco. She reports that she does not drink alcohol and does not use drugs.    Allergies: Penicillins and Codeine    History reviewed. No pertinent surgical history.       Current Outpatient Medications:   •  Aspirin Low Dose 81 MG EC tablet, , Disp: , Rfl:   •  calcium carbonate (OS-LISSETTE) 1250 (500 Ca) MG chewable tablet, 1 tab(s), Disp: , Rfl:   •  carvedilol (COREG) 25 MG tablet, Take 25 mg by mouth 2 (Two) Times a Day With Meals., Disp: , Rfl:   •  cloNIDine (CATAPRES) 0.1 MG tablet, Take 0.1 mg by mouth 2 (Two) Times a Day., Disp: , Rfl:   •  hydroCHLOROthiazide (HYDRODIURIL) 25 MG tablet, , Disp: , Rfl:   •  metFORMIN ER (GLUCOPHAGE-XR) 500 MG 24 hr tablet, Daily., Disp: , Rfl:   •  omeprazole (priLOSEC) 20 MG capsule, Take 20 mg by mouth Daily., Disp: , Rfl:   •  simvastatin (ZOCOR) 20 MG tablet, Take 20 mg by mouth Every Night., Disp: , Rfl:      Medications Discontinued During This Encounter   Medication Reason   • solifenacin (VESICARE) 5 MG tablet *Therapy completed        Review of Systems   Respiratory: Negative for chest tightness and shortness of breath.    Cardiovascular: Negative for chest pain, palpitations and leg  "swelling.   Neurological: Negative for dizziness, syncope, weakness, light-headedness and numbness.   All other systems reviewed and are negative.       Objective     Physical Exam  Constitutional:       General: She is not in acute distress.  Neck:      Vascular: No carotid bruit.   Cardiovascular:      Rate and Rhythm: Normal rate and regular rhythm.      Heart sounds: Murmur (1/6 to 2/6 systolic murmur at the apex) heard.   Pulmonary:      Effort: Pulmonary effort is normal.      Breath sounds: Normal breath sounds.   Musculoskeletal:      Right lower leg: No edema.      Left lower leg: No edema.   Neurological:      Mental Status: She is alert.       /77 (BP Location: Right arm, Patient Position: Sitting)   Pulse 77   Ht 160 cm (63\")   Wt 73.5 kg (162 lb)   BMI 28.70 kg/m²       Vitals:    04/01/22 1031   BP: 146/77   Pulse: 77       Result Review :         The following data was reviewed by: CATY Whiteside on 04/01/2022:        CMP    CMP 3/25/22   Glucose 117 (A)   BUN 16   Creatinine 0.87   Sodium 142   Potassium 4.1   Chloride 102   Calcium 9.9   Albumin 4.60   Total Bilirubin 0.7   Alkaline Phosphatase 56   AST (SGOT) 19   ALT (SGPT) 19   (A) Abnormal value               Lipid Panel    Lipid Panel 3/25/22   Total Cholesterol 119   Triglycerides 56   HDL Cholesterol 60   VLDL Cholesterol 13   LDL Cholesterol  46   LDL/HDL Ratio 0.80            Lab Results   Component Value Date    TSH 1.880 03/25/2022      Lab Results   Component Value Date    FREET4 1.26 03/25/2022              Assessment and Plan    Diagnoses and all orders for this visit:    1. Essential hypertension (Primary)  Assessment & Plan:  Controlled.  Continue hydrochlorothiazide 25 mg, clonidine 0.1 mg twice daily and carvedilol 25 mg twice daily      2. Hyperlipidemia, unspecified hyperlipidemia type  Assessment & Plan:  Well-controlled.  Continue simvastatin 20 mg.    Orders:  -     Lipid Panel; Future  -     Comprehensive " Metabolic Panel; Future          Follow Up     Return in about 6 months (around 10/1/2022) for with Dr. Darling.    Patient was given instructions and counseling regarding her condition or for health maintenance advice. Please see specific information pulled into the AVS if appropriate.       CATY Denton  04/01/22 10:38 EDT

## 2022-04-01 NOTE — ASSESSMENT & PLAN NOTE
Controlled.  Continue hydrochlorothiazide 25 mg, clonidine 0.1 mg twice daily and carvedilol 25 mg twice daily

## 2022-09-19 ENCOUNTER — TRANSCRIBE ORDERS (OUTPATIENT)
Dept: ADMINISTRATIVE | Facility: HOSPITAL | Age: 82
End: 2022-09-19

## 2022-09-19 DIAGNOSIS — Z12.31 SCREENING MAMMOGRAM, ENCOUNTER FOR: Primary | ICD-10-CM

## 2022-10-21 ENCOUNTER — OFFICE VISIT (OUTPATIENT)
Dept: CARDIOLOGY | Facility: CLINIC | Age: 82
End: 2022-10-21

## 2022-10-21 VITALS
BODY MASS INDEX: 28.53 KG/M2 | HEIGHT: 63 IN | SYSTOLIC BLOOD PRESSURE: 157 MMHG | HEART RATE: 72 BPM | DIASTOLIC BLOOD PRESSURE: 83 MMHG | WEIGHT: 161 LBS

## 2022-10-21 DIAGNOSIS — E78.5 HYPERLIPIDEMIA LDL GOAL <70: ICD-10-CM

## 2022-10-21 DIAGNOSIS — I10 ESSENTIAL HYPERTENSION: Primary | ICD-10-CM

## 2022-10-21 PROCEDURE — 99213 OFFICE O/P EST LOW 20 MIN: CPT | Performed by: INTERNAL MEDICINE

## 2022-11-10 ENCOUNTER — APPOINTMENT (OUTPATIENT)
Dept: MAMMOGRAPHY | Facility: HOSPITAL | Age: 82
End: 2022-11-10

## 2022-11-18 ENCOUNTER — HOSPITAL ENCOUNTER (OUTPATIENT)
Dept: MAMMOGRAPHY | Facility: HOSPITAL | Age: 82
Discharge: HOME | End: 2022-11-18
Admitting: INTERNAL MEDICINE

## 2022-11-18 DIAGNOSIS — Z12.31 SCREENING MAMMOGRAM, ENCOUNTER FOR: ICD-10-CM

## 2022-11-18 PROCEDURE — 77067 SCR MAMMO BI INCL CAD: CPT

## 2022-11-18 PROCEDURE — 77063 BREAST TOMOSYNTHESIS BI: CPT

## 2023-04-21 PROBLEM — E11.9 TYPE 2 DIABETES MELLITUS: Status: ACTIVE | Noted: 2023-04-21

## 2023-04-21 PROBLEM — E55.9 VITAMIN D DEFICIENCY: Status: ACTIVE | Noted: 2023-04-21

## 2023-04-21 PROBLEM — R55 SYNCOPE AND COLLAPSE: Status: ACTIVE | Noted: 2023-04-21

## 2023-04-21 PROBLEM — N32.81 OAB (OVERACTIVE BLADDER): Status: ACTIVE | Noted: 2023-04-21

## 2023-04-21 PROBLEM — K21.9 CHRONIC GERD: Status: ACTIVE | Noted: 2023-04-21

## 2023-04-21 RX ORDER — LOSARTAN POTASSIUM 100 MG/1
100 TABLET ORAL DAILY
COMMUNITY

## 2023-04-21 RX ORDER — CELECOXIB 200 MG/1
200 CAPSULE ORAL DAILY
COMMUNITY

## 2023-04-21 RX ORDER — TRAMADOL HYDROCHLORIDE 50 MG/1
50 TABLET ORAL EVERY 6 HOURS PRN
COMMUNITY

## 2023-04-26 ENCOUNTER — OFFICE VISIT (OUTPATIENT)
Dept: UROLOGY | Facility: CLINIC | Age: 83
End: 2023-04-26
Payer: MEDICARE

## 2023-04-26 VITALS — RESPIRATION RATE: 16 BRPM | BODY MASS INDEX: 23.07 KG/M2 | WEIGHT: 152.2 LBS | HEIGHT: 68 IN

## 2023-04-26 DIAGNOSIS — R35.1 NOCTURIA: ICD-10-CM

## 2023-04-26 DIAGNOSIS — N39.41 URGE INCONTINENCE: Primary | ICD-10-CM

## 2023-04-26 DIAGNOSIS — N32.81 OAB (OVERACTIVE BLADDER): ICD-10-CM

## 2023-04-26 RX ORDER — ASPIRIN 81 MG/1
81 TABLET, CHEWABLE ORAL
COMMUNITY

## 2023-04-26 RX ORDER — CLONIDINE HYDROCHLORIDE 0.2 MG/1
TABLET ORAL
COMMUNITY
Start: 2023-01-20

## 2023-04-26 RX ORDER — METFORMIN HYDROCHLORIDE 500 MG/1
500 TABLET, EXTENDED RELEASE ORAL
COMMUNITY

## 2023-04-26 RX ORDER — CALCIUM CARBONATE-CHOLECALCIFEROL TAB 250 MG-125 UNIT 250-125 MG-UNIT
1 TAB ORAL
COMMUNITY

## 2023-04-26 RX ORDER — CARVEDILOL 12.5 MG/1
25 TABLET ORAL
COMMUNITY

## 2023-04-26 RX ORDER — HYDROCHLOROTHIAZIDE 25 MG/1
25 TABLET ORAL
COMMUNITY

## 2023-04-26 NOTE — PROGRESS NOTES
UROLOGY OFFICE H&P NOTE    Subjective   HPI  Rosalia Gilmore is a 82 y.o. female.  Presents for evaluation of urinary urgency and frequency.  Long history of overactive bladder refractory to medications.      If she does not urinate when she first gets the urge, she trickles a little before she can get to the bathroom. At night, her urine may be a little larger in volume. During the day if she is up and about, she can make it to the bathroom a little trickle.     She is wearing a pad. She changes her pad twice a day.     She feels like her incontinence is getting worse.     She drinks a couple of cups of coffee a day.   She is on HCTZ in the morning, but her leakage is worse at night. She is most bothered by the leakage when she is getting goingto the bed. She is getting up 2 to 3 times a night.     Her diabetes is well controlled.    Had been on Vesicare for a couple of years. It was doing okay for her for a long time. She started getting real dry mouth. The patient was on Detrol before, as well which stopped working.  Dr. Liu gave her samples of Gemtesa. It seemed like it was working at first, but then after about a month or so, she had dry mouth. She does not remember if she was on Myrbetriq.     Medical History:  Past Medical History:   Diagnosis Date   • Chronic GERD 4/21/2023   • Essential hypertension 3/28/2022   • Hyperlipidemia 3/28/2022   • OAB (overactive bladder) 4/21/2023   • Vitamin D deficiency 4/21/2023        Social History:  Social History     Socioeconomic History   • Marital status:    Tobacco Use   • Smoking status: Never   • Smokeless tobacco: Never   Vaping Use   • Vaping Use: Never used   Substance and Sexual Activity   • Alcohol use: Never   • Drug use: Never   • Sexual activity: Defer        Family History:  History reviewed. No pertinent family history.     Surgical History:  History reviewed. No pertinent surgical history.     Allergies:  Allergies   Allergen Reactions  "  • Penicillins Anaphylaxis   • Codeine Itching        Current Medications:  Current Outpatient Medications   Medication Sig Dispense Refill   • aspirin 81 MG chewable tablet Chew 1 tablet.     • Calcium Carb-Cholecalciferol 250-3.125 MG-MCG tablet Take 1 tablet by mouth.     • carvedilol (COREG) 12.5 MG tablet Take 2 tablets by mouth.     • celecoxib (CeleBREX) 200 MG capsule Take 1 capsule by mouth Daily.     • cloNIDine (CATAPRES) 0.1 MG tablet Take 1 tablet by mouth 2 (Two) Times a Day.     • hydroCHLOROthiazide (HYDRODIURIL) 25 MG tablet      • losartan (COZAAR) 100 MG tablet Take 1 tablet by mouth Daily.     • metFORMIN ER (GLUCOPHAGE-XR) 500 MG 24 hr tablet Daily.     • omeprazole (priLOSEC) 20 MG capsule Take 1 capsule by mouth Daily.     • simvastatin (ZOCOR) 20 MG tablet Take 1 tablet by mouth Every Night.     • traMADol (ULTRAM) 50 MG tablet Take 1 tablet by mouth Every 6 (Six) Hours As Needed.     • Aspirin Low Dose 81 MG EC tablet  (Patient not taking: Reported on 4/26/2023)     • calcium carbonate (OS-LISSETTE) 1250 (500 Ca) MG chewable tablet 1 tab(s) (Patient not taking: Reported on 4/26/2023)     • carvedilol (COREG) 25 MG tablet Take 25 mg by mouth 2 (Two) Times a Day With Meals. (Patient not taking: Reported on 4/26/2023)     • cloNIDine (CATAPRES) 0.2 MG tablet  (Patient not taking: Reported on 4/26/2023)     • hydroCHLOROthiazide (HYDRODIURIL) 25 MG tablet Take 1 tablet by mouth. (Patient not taking: Reported on 4/26/2023)     • metFORMIN ER (GLUCOPHAGE-XR) 500 MG 24 hr tablet Take 1 tablet by mouth. (Patient not taking: Reported on 4/26/2023)     • Mirabegron ER (Myrbetriq) 25 MG tablet sustained-release 24 hour 24 hr tablet Take 2 tablets by mouth Daily. 90 tablet 0     No current facility-administered medications for this visit.       Review of systems  A review of systems was performed, and positive findings are noted in the HPI.    Objective     Vital Signs:   Resp 16   Ht 172.7 cm (68\")   Wt " 69 kg (152 lb 3.2 oz)   BMI 23.14 kg/m²       Physical exam  No acute distress, well-nourished  Awake alert and oriented  Mood normal; affect normal      Problem List:  Patient Active Problem List   Diagnosis   • Essential hypertension   • Hyperlipidemia   • Type 2 diabetes mellitus   • Syncope and collapse   • OAB (overactive bladder)   • Chronic GERD   • Vitamin D deficiency       Assessment & Plan   Diagnoses and all orders for this visit:    1. Urge incontinence (Primary)  -     Mirabegron ER (Myrbetriq) 25 MG tablet sustained-release 24 hour 24 hr tablet; Take 2 tablets by mouth Daily.  Dispense: 90 tablet; Refill: 0    2. OAB (overactive bladder)    3. Nocturia      1. Overactive bladder.  - We discussed different treatment options including posterior tibial nerve stimulation, Botox injections, and InterStim therapy.  Does not wish to progress to tertiary management.  - The patient would like to try Myrbetriq 25 mg.She may want to take this in the eveing since that is when her sypmtoms are the worse.  - We discussed the side effects of Myrbetriq.   - She was advised to decrease her fluid intake to 2.5 to 3 hours prior to sleep.    Follow-up in 3 months. All questions and concerns have been addressed at this time.          Transcribed from ambient dictation for Kenzie Posey MD by Sharon Gibson.  04/26/23   14:09 EDT    Patient or patient representative verbalized consent to the visit recording.  I have personally performed the services described in this document as transcribed by the above individual, and it is both accurate and complete.

## 2023-07-24 PROBLEM — I10 ESSENTIAL HYPERTENSION: Chronic | Status: ACTIVE | Noted: 2020-10-21

## 2023-07-24 PROBLEM — E11.21 TYPE 2 DIABETES MELLITUS WITH DIABETIC NEPHROPATHY: Status: ACTIVE | Noted: 2020-10-21

## 2023-07-24 PROBLEM — N18.2 STAGE 2 CHRONIC KIDNEY DISEASE: Status: ACTIVE | Noted: 2020-10-21

## 2023-07-26 DIAGNOSIS — N39.41 URGE INCONTINENCE: ICD-10-CM

## 2023-08-15 ENCOUNTER — OFFICE VISIT (OUTPATIENT)
Dept: UROLOGY | Facility: CLINIC | Age: 83
End: 2023-08-15
Payer: MEDICARE

## 2023-08-15 VITALS — RESPIRATION RATE: 16 BRPM | WEIGHT: 146.2 LBS | HEIGHT: 68 IN | BODY MASS INDEX: 22.16 KG/M2

## 2023-08-15 DIAGNOSIS — N39.41 URGE INCONTINENCE: Primary | ICD-10-CM

## 2023-08-15 NOTE — PROGRESS NOTES
"    UROLOGY OFFICE follow up NOTE    Subjective   HPI  Rosalia Gilmore is a 83 y.o. female.   Presents for evaluation of urinary urgency and frequency.  Long history of overactive bladder refractory to medications.       If she does not urinate when she first gets the urge, she trickles a little before she can get to the bathroom. At night, her urine may be a little larger in volume. During the day if she is up and about, she can make it to the bathroom a little trickle.      She is wearing a pad. She changes her pad twice a day.      She feels like her incontinence is getting worse.      She drinks a couple of cups of coffee a day.   She is on HCTZ in the morning, but her leakage is worse at night. She is most bothered by the leakage when she is getting goingto the bed. She is getting up 2 to 3 times a night.      Her diabetes is well controlled.     Had been on Vesicare for a couple of years. It was doing okay for her for a long time. She started getting real dry mouth. The patient was on Detrol before, as well which stopped working.  Dr. Liu gave her samples of Gemtesa. It seemed like it was working at first, but then after about a month or so, she had dry mouth. She does not remember if she was on Myrbetriq.     Update 8/15/2023: Patient presents for follow-up of urge urinary incontinence, nocturia.  On Myrbetriq 25 mg.  The patient reports that the Myrbetriq is helping. Her urinary symptoms are more controlled during the day than at night because she drinks liquids before going to bed. The patient leaks at night. She denies any side effects from the Myrbetriq.        Review of systems  A review of systems was performed, and positive findings are noted in the HPI.    Objective     Vital Signs:   Resp 16   Ht 172.7 cm (68\")   Wt 66.3 kg (146 lb 3.2 oz)   BMI 22.23 kg/mý       Physical exam  No acute distress, well-nourished  Awake alert and oriented  Mood normal; affect normal    Problem List:  Patient " Active Problem List   Diagnosis    Essential hypertension    Hyperlipidemia    Type 2 diabetes mellitus with diabetic nephropathy    Syncope and collapse    OAB (overactive bladder)    Chronic GERD    Vitamin D deficiency    Stage 2 chronic kidney disease       Assessment & Plan   Diagnoses and all orders for this visit:    1. Urge incontinence (Primary)  -     Mirabegron ER (Myrbetriq) 50 MG tablet sustained-release 24 hour 24 hr tablet; Take 50 mg by mouth Daily.  Dispense: 60 tablet; Refill: 0  -     Mirabegron ER (Myrbetriq) 50 MG tablet sustained-release 24 hour 24 hr tablet; Take 50 mg by mouth Daily.  Dispense: 90 tablet; Refill: 0    - We will increase her Myrbetriq to 50 mg once daily.  Does not wish to pursure tertiary mgt options   - We will provide her with samples of Myrbetriq 50 mg and send to pharmacy    Follow-up in 3 months.   All questions and concerns have been addressed at this time.        Transcribed from ambient dictation for Kenzie Posey MD by Dyan Landaverde.  08/15/23   14:01 EDT    Patient or patient representative verbalized consent to the visit recording.  I have personally performed the services described in this document as transcribed by the above individual, and it is both accurate and complete.

## 2023-10-20 ENCOUNTER — OFFICE VISIT (OUTPATIENT)
Dept: CARDIOLOGY | Facility: CLINIC | Age: 83
End: 2023-10-20
Payer: MEDICARE

## 2023-10-20 VITALS
HEIGHT: 68 IN | WEIGHT: 151 LBS | HEART RATE: 65 BPM | BODY MASS INDEX: 22.88 KG/M2 | DIASTOLIC BLOOD PRESSURE: 90 MMHG | SYSTOLIC BLOOD PRESSURE: 163 MMHG

## 2023-10-20 DIAGNOSIS — I10 PRIMARY HYPERTENSION: Primary | ICD-10-CM

## 2023-10-20 DIAGNOSIS — E78.2 MIXED HYPERLIPIDEMIA: ICD-10-CM

## 2023-10-20 PROCEDURE — 99213 OFFICE O/P EST LOW 20 MIN: CPT | Performed by: INTERNAL MEDICINE

## 2023-10-20 PROCEDURE — 3080F DIAST BP >= 90 MM HG: CPT | Performed by: INTERNAL MEDICINE

## 2023-10-20 PROCEDURE — 3077F SYST BP >= 140 MM HG: CPT | Performed by: INTERNAL MEDICINE

## 2023-10-20 NOTE — PROGRESS NOTES
Chief Complaint  Hypertension    Subjective        Rosalia MA Gilmore presents to [facility] CARDIOLOGY  History of present illness:    Patient states overall she is doing well.  When she is walking around she notes no chest pain.  She denies any palpitations.  She notes very slight ankle edema.  She does watch her salt.  She does not smoke.  She is getting blood pressures at home that she checks 3 times per day in the 110/72 130/80 range.      Past Medical History:   Diagnosis Date    Chronic GERD 4/21/2023    Essential hypertension 3/28/2022    Hyperlipidemia 3/28/2022    OAB (overactive bladder) 4/21/2023    Vitamin D deficiency 4/21/2023         History reviewed. No pertinent surgical history.       Social History     Socioeconomic History    Marital status:    Tobacco Use    Smoking status: Never    Smokeless tobacco: Never   Vaping Use    Vaping Use: Never used   Substance and Sexual Activity    Alcohol use: Never    Drug use: Never    Sexual activity: Defer         History reviewed. No pertinent family history.       Allergies   Allergen Reactions    Penicillins Anaphylaxis    Codeine Itching            Current Outpatient Medications:     aspirin 81 MG chewable tablet, Chew 1 tablet., Disp: , Rfl:     Calcium Carb-Cholecalciferol 250-3.125 MG-MCG tablet, Take 1 tablet by mouth., Disp: , Rfl:     carvedilol (COREG) 12.5 MG tablet, Take 2 tablets by mouth., Disp: , Rfl:     celecoxib (CeleBREX) 200 MG capsule, Take 1 capsule by mouth Daily., Disp: , Rfl:     cloNIDine (CATAPRES) 0.1 MG tablet, Take 1 tablet by mouth 2 (Two) Times a Day., Disp: , Rfl:     hydroCHLOROthiazide (HYDRODIURIL) 25 MG tablet, , Disp: , Rfl:     losartan (COZAAR) 100 MG tablet, Take 1 tablet by mouth Daily., Disp: , Rfl:     metFORMIN ER (GLUCOPHAGE-XR) 500 MG 24 hr tablet, Daily., Disp: , Rfl:     Mirabegron ER (Myrbetriq) 50 MG tablet sustained-release 24 hour 24 hr tablet, Take 50 mg by mouth Daily., Disp:  "90 tablet, Rfl: 0    omeprazole (priLOSEC) 20 MG capsule, Take 1 capsule by mouth Daily., Disp: , Rfl:     simvastatin (ZOCOR) 20 MG tablet, Take 1 tablet by mouth Every Night., Disp: , Rfl:     traMADol (ULTRAM) 50 MG tablet, Take 1 tablet by mouth Every 6 (Six) Hours As Needed., Disp: , Rfl:       ROS:  Cardiac review of systems negative.    Objective     /90   Pulse 65   Ht 172.7 cm (68\")   Wt 68.5 kg (151 lb)   BMI 22.96 kg/m²       General Appearance:   well developed  well nourished  HENT:   oropharynx moist  lips not cyanotic  Respiratory:  no respiratory distress  normal breath sounds  no rales  Cardiovascular:  no jugular venous distention  regular rhythm  S1 normal, S2 normal  no S3, no S4   no murmur  no rub, no thrill  No carotid bruit  pedal pulses normal  lower extremity edema: none    Musculoskeletal:  no clubbing of fingers.   normocephalic, head atraumatic  Skin:   warm, dry  Psychiatric:  judgement and insight appropriate  normal mood and affect    ECHO:    STRESS:    CATH:  No results found for this or any previous visit.    BMP:     Glucose   Date Value Ref Range Status   03/25/2022 117 (H) 65 - 99 mg/dL Final     BUN   Date Value Ref Range Status   03/25/2022 16 8 - 23 mg/dL Final     Creatinine   Date Value Ref Range Status   03/25/2022 0.87 0.57 - 1.00 mg/dL Final     Sodium   Date Value Ref Range Status   03/25/2022 142 136 - 145 mmol/L Final     Potassium   Date Value Ref Range Status   03/25/2022 4.1 3.5 - 5.2 mmol/L Final     Chloride   Date Value Ref Range Status   03/25/2022 102 98 - 107 mmol/L Final     CO2   Date Value Ref Range Status   03/25/2022 28.6 22.0 - 29.0 mmol/L Final     Calcium   Date Value Ref Range Status   03/25/2022 9.9 8.6 - 10.5 mg/dL Final     BUN/Creatinine Ratio   Date Value Ref Range Status   03/25/2022 18.4 7.0 - 25.0 Final     Anion Gap   Date Value Ref Range Status   03/25/2022 11.4 5.0 - 15.0 mmol/L Final     eGFR   Date Value Ref Range Status "   03/25/2022 67.0 >60.0 mL/min/1.73 Final     Comment:     National Kidney Foundation and American Society of Nephrology (ASN) Task Force recommended calculation based on the Chronic Kidney Disease Epidemiology Collaboration (CKD-EPI) equation refit without adjustment for race.     LIPIDS:  Total Cholesterol   Date Value Ref Range Status   03/25/2022 119 0 - 200 mg/dL Final     Triglycerides   Date Value Ref Range Status   03/25/2022 56 0 - 150 mg/dL Final     HDL Cholesterol   Date Value Ref Range Status   03/25/2022 60 40 - 60 mg/dL Final     LDL Cholesterol    Date Value Ref Range Status   03/25/2022 46 0 - 100 mg/dL Final     VLDL Cholesterol   Date Value Ref Range Status   03/25/2022 13 5 - 40 mg/dL Final     LDL/HDL Ratio   Date Value Ref Range Status   03/25/2022 0.80  Final         Procedures             ASSESSMENT:  Diagnoses and all orders for this visit:    1. Primary hypertension (Primary)    2. Mixed hyperlipidemia         PLAN:    1.  Patient checks her blood pressure 3 times a day.  She gets great results at home.  I did ask her to bring her blood pressure cuff in and check it against ours in a week or 2 to make sure it is accurate.  2.  Continue the Zocor.  Patient's cholesterol is been under excellent control.  3.  Patient had an echocardiogram in 2020 that showed normal ejection fraction and no significant valvular disease.  4.  Encouraged the patient to continue to remain active and call us if any exertional cardiac complaints.  Overall she appears to be doing very well from a cardiac standpoint and her modifiable risk factors are under excellent control.      Return in about 1 year (around 10/20/2024).     Patient was given instructions and counseling regarding her condition or for health maintenance advice. Please see specific information pulled into the AVS if appropriate.         Blaine Juarez MD   10/20/2023  11:52 EDT

## 2023-11-01 ENCOUNTER — TRANSCRIBE ORDERS (OUTPATIENT)
Dept: ADMINISTRATIVE | Facility: HOSPITAL | Age: 83
End: 2023-11-01
Payer: MEDICARE

## 2023-11-01 DIAGNOSIS — Z12.31 SCREENING MAMMOGRAM FOR BREAST CANCER: Primary | ICD-10-CM

## 2023-11-02 ENCOUNTER — OFFICE VISIT (OUTPATIENT)
Dept: CARDIOLOGY | Facility: CLINIC | Age: 83
End: 2023-11-02
Payer: MEDICARE

## 2023-11-02 ENCOUNTER — TELEPHONE (OUTPATIENT)
Dept: CARDIOLOGY | Facility: CLINIC | Age: 83
End: 2023-11-02

## 2023-11-02 DIAGNOSIS — I10 PRIMARY HYPERTENSION: Primary | ICD-10-CM

## 2023-11-02 NOTE — PROGRESS NOTES
Patient appears to have some degree of whitecoat hypertension.  She monitors her blood pressure at home and notes that it is never higher than 130s over 80s.  She has not completed a blood pressure log.  She was supposed to present today with her home blood pressure cuff so that we could compare it to our blood pressure cuff.  Would recommend that she come back in a week or 2 with her home blood pressure cuff for comparison as well as with a home blood pressure log.

## 2023-11-02 NOTE — TELEPHONE ENCOUNTER
Pt came into the office for a 1-2 week bp check only.  Pt stated she has been taking it at home.  She did not write down the results but stated it is controlled at home.  Highest reading was 130's / 80's.  In the office today, it was elevated.    1st reading -   /100, HR 71    2nd reading -   /83, HR 72    I gave her a bp log and asked that she take her bp at the times stated on the log.  She brought two bp cuffs with her.  The wrist cuff was the more accurate one for her to use.     Please advise, thank you.

## 2023-11-16 ENCOUNTER — OFFICE VISIT (OUTPATIENT)
Dept: UROLOGY | Facility: CLINIC | Age: 83
End: 2023-11-16
Payer: MEDICARE

## 2023-11-16 VITALS
HEART RATE: 69 BPM | HEIGHT: 68 IN | SYSTOLIC BLOOD PRESSURE: 163 MMHG | WEIGHT: 144.8 LBS | DIASTOLIC BLOOD PRESSURE: 78 MMHG | BODY MASS INDEX: 21.95 KG/M2

## 2023-11-16 DIAGNOSIS — N39.41 URGE INCONTINENCE: ICD-10-CM

## 2023-11-16 RX ORDER — LANCETS 28 GAUGE
EACH MISCELLANEOUS
COMMUNITY
Start: 2023-10-30

## 2023-11-16 RX ORDER — BLOOD SUGAR DIAGNOSTIC
STRIP MISCELLANEOUS
COMMUNITY
Start: 2023-10-30

## 2023-11-16 NOTE — PROGRESS NOTES
UROLOGY OFFICE FOLLOW UP NOTE    Subjective   HPI  Rosalia Gilmore is a 83 y.o. female.  Presents for evaluation of urinary urgency and frequency.  Long history of overactive bladder refractory to medications.       If she does not urinate when she first gets the urge, she trickles a little before she can get to the bathroom. At night, her urine may be a little larger in volume. During the day if she is up and about, she can make it to the bathroom a little trickle.      She is wearing a pad. She changes her pad twice a day.      She feels like her incontinence is getting worse.      She drinks a couple of cups of coffee a day.   She is on HCTZ in the morning, but her leakage is worse at night. She is most bothered by the leakage when she is getting goingto the bed. She is getting up 2 to 3 times a night.      Her diabetes is well controlled.     Had been on Vesicare for a couple of years. It was doing okay for her for a long time. She started getting real dry mouth. The patient was on Detrol before, as well which stopped working.  Dr. Liu gave her samples of Gemtesa. It seemed like it was working at first, but then after about a month or so, she had dry mouth. She does not remember if she was on Myrbetriq.      Update 8/15/2023: Patient presents for follow-up of urge urinary incontinence, nocturia.  On Myrbetriq 25 mg.  The patient reports that the Myrbetriq is helping. Her urinary symptoms are more controlled during the day than at night because she drinks liquids before going to bed. The patient leaks at night. She denies any side effects from the Myrbetriq.    Update 11/16/23: Patient presents for follow-up of urge urinary incontinence, nocturia.  The patient is doing well. She denies any significant pain or new complaints today.  The patient is taking Myrbetriq. She does not have any leakage when she does not drink a lot of fluids at night. She wants stay on the higher dose and she is not having any  "real side effects from it.          Allergies:  Allergies   Allergen Reactions    Penicillins Anaphylaxis    Codeine Itching        Current Medications:  Current Outpatient Medications   Medication Sig Dispense Refill    aspirin 81 MG chewable tablet Chew 1 tablet.      Calcium Carb-Cholecalciferol 250-3.125 MG-MCG tablet Take 1 tablet by mouth.      carvedilol (COREG) 12.5 MG tablet Take 2 tablets by mouth.      celecoxib (CeleBREX) 200 MG capsule Take 1 capsule by mouth Daily.      cloNIDine (CATAPRES) 0.1 MG tablet Take 1 tablet by mouth 2 (Two) Times a Day.      hydroCHLOROthiazide (HYDRODIURIL) 25 MG tablet       Lancets (freestyle) lancets       losartan (COZAAR) 100 MG tablet Take 1 tablet by mouth Daily.      metFORMIN ER (GLUCOPHAGE-XR) 500 MG 24 hr tablet Daily.      Mirabegron ER (Myrbetriq) 50 MG tablet sustained-release 24 hour 24 hr tablet Take 50 mg by mouth Daily. 90 tablet 0    omeprazole (priLOSEC) 20 MG capsule Take 1 capsule by mouth Daily.      PRECISION XTRA TEST STRIPS test strip       simvastatin (ZOCOR) 20 MG tablet Take 1 tablet by mouth Every Night.      traMADol (ULTRAM) 50 MG tablet Take 1 tablet by mouth Every 6 (Six) Hours As Needed.       No current facility-administered medications for this visit.       Review of systems  A review of systems was performed, and positive findings are noted in the HPI.    Objective     Vital Signs:   /78   Pulse 69   Ht 172.7 cm (68\")   Wt 65.7 kg (144 lb 12.8 oz)   BMI 22.02 kg/m²       Physical exam  No acute distress, well-nourished  Awake alert and oriented  Mood normal; affect normal    Problem List:  Patient Active Problem List   Diagnosis    Essential hypertension    Hyperlipidemia    Type 2 diabetes mellitus with diabetic nephropathy    Syncope and collapse    OAB (overactive bladder)    Chronic GERD    Vitamin D deficiency    Stage 2 chronic kidney disease       Assessment & Plan   Diagnoses and all orders for this visit:    1. Urge " incontinence  -     Mirabegron ER (Myrbetriq) 50 MG tablet sustained-release 24 hour 24 hr tablet; Take 50 mg by mouth Daily.  Dispense: 90 tablet; Refill: 3  -     Mirabegron ER (Myrbetriq) 50 MG tablet sustained-release 24 hour 24 hr tablet; Take 50 mg by mouth Daily.  Dispense: 90 tablet; Refill: 0      Doing well with current regimen    - Advised patient decreasing fluids at night or backing off how much you drink before she goes to sleep about 2-1/2 to 3 hours before she goes to sleep.    - Mirabegron ER (Myrbetriq) refilled and months samples given.    Patient will return in 1 year or earlier as needed      Transcribed from ambient dictation for Kenzie Posey MD by Jacque Akhtar.  11/16/23   14:17 EST    Patient or patient representative verbalized consent to the visit recording.  I have personally performed the services described in this document as transcribed by the above individual, and it is both accurate and complete.

## 2023-11-17 ENCOUNTER — TELEPHONE (OUTPATIENT)
Dept: CARDIOLOGY | Facility: CLINIC | Age: 83
End: 2023-11-17

## 2023-11-17 ENCOUNTER — OFFICE VISIT (OUTPATIENT)
Dept: CARDIOLOGY | Facility: CLINIC | Age: 83
End: 2023-11-17
Payer: MEDICARE

## 2023-11-17 DIAGNOSIS — I10 PRIMARY HYPERTENSION: Primary | ICD-10-CM

## 2023-11-17 NOTE — TELEPHONE ENCOUNTER
Home bp monitor was reviewed and demonstrates normal blood pressures. Continue current medication and home BP monitoring.

## 2023-11-17 NOTE — PROGRESS NOTES
Pt presented for a 2 week bp only.  Pt brought in a log of results.  It was given to  to scan in.      1st reading -   /95, HR 69     2nd reading -   /104, HR 71      Home BP log was reviewed and demonstrated normal blood pressure readings.  Continue current medications and home BP monitoring.  Patient was not seen or examined by the provider.

## 2023-11-17 NOTE — TELEPHONE ENCOUNTER
Pt presented for a 2 week bp only.  Pt brought in a log of results.  It was given to  to scan in.     1st reading -   /95, HR 69    2nd reading -   /104, HR 71    2 weeks ago, pt brought in her home bp and we tested it then.  Gave pt another bp log and asked her to continue home monitoring.

## 2023-12-04 ENCOUNTER — HOSPITAL ENCOUNTER (OUTPATIENT)
Dept: MAMMOGRAPHY | Facility: HOSPITAL | Age: 83
Discharge: HOME | End: 2023-12-04
Admitting: INTERNAL MEDICINE
Payer: MEDICARE

## 2023-12-04 DIAGNOSIS — Z12.31 SCREENING MAMMOGRAM FOR BREAST CANCER: ICD-10-CM

## 2023-12-04 PROCEDURE — 77063 BREAST TOMOSYNTHESIS BI: CPT

## 2023-12-04 PROCEDURE — 77067 SCR MAMMO BI INCL CAD: CPT

## 2024-03-05 DIAGNOSIS — N39.41 URGE INCONTINENCE: ICD-10-CM

## 2024-05-01 ENCOUNTER — TELEPHONE (OUTPATIENT)
Dept: UROLOGY | Facility: CLINIC | Age: 84
End: 2024-05-01
Payer: MEDICARE

## 2024-05-01 NOTE — TELEPHONE ENCOUNTER
PATIENT IS OUT OF MYRBETRIQ 50 MG SAMPLES.  SHE SAID SHE WAS GOING TO COME GET SOME, AND I TOLD HER I WOULD NEED TO SEND A MESSAGE, BECAUSE WE MAY NOT HAVE ANY.

## 2024-05-02 ENCOUNTER — TELEPHONE (OUTPATIENT)
Dept: UROLOGY | Facility: CLINIC | Age: 84
End: 2024-05-02
Payer: MEDICARE

## 2024-05-02 DIAGNOSIS — N39.41 URGE INCONTINENCE: Primary | ICD-10-CM

## 2024-05-02 NOTE — TELEPHONE ENCOUNTER
Called pt, informed samples are at the .     LOV 11/16/2023  F/U 11/21/2024    Informed pt of assistant program with the medication online, she could try and fill out to see if she can qualify.

## 2024-05-15 ENCOUNTER — TELEPHONE (OUTPATIENT)
Dept: UROLOGY | Facility: CLINIC | Age: 84
End: 2024-05-15
Payer: MEDICARE

## 2024-06-04 ENCOUNTER — TELEPHONE (OUTPATIENT)
Dept: UROLOGY | Facility: CLINIC | Age: 84
End: 2024-06-04
Payer: MEDICARE

## 2024-06-04 NOTE — TELEPHONE ENCOUNTER
PT CAME IN, WANTING TO SEE IF SHE CAN GET SOME MORE MYRBETRIQ-  SHE IS WAITING ON THE DELIVERY FROM [facility].  SHE IS OUT.  PLEASE CALL HER [phone] AND LET HER KNOW IF WE HAVE ANY SAMPLES SHE COULD .

## 2024-07-10 ENCOUNTER — TELEPHONE (OUTPATIENT)
Dept: UROLOGY | Facility: CLINIC | Age: 84
End: 2024-07-10
Payer: MEDICARE

## 2024-07-10 DIAGNOSIS — N39.41 URGE INCONTINENCE: ICD-10-CM

## 2024-07-10 RX ORDER — MIRABEGRON 50 MG/1
50 TABLET, EXTENDED RELEASE ORAL DAILY
Qty: 90 TABLET | Refills: 3 | Status: CANCELLED | OUTPATIENT
Start: 2024-07-10

## 2024-07-10 NOTE — TELEPHONE ENCOUNTER
Caller: Rosalia Gilmore     Relationship: SELF    Best call back number: [phone]    Which medication are you concerned about:  Mirabegron ER (Myrbetriq) 50 MG tablet sustained-release 24 hour 24 hr tablet     Who prescribed you this medication: DR SOUSA     When did you start taking this medication: A FEW MONTHS    What are your concerns: THE PHARMACY CALLED PT AND STATED THEY NEED INFORMATION FROM DR SOUSA. PLEASE GIVE [facility] A CALL.    [facility] - [address] [phone] [facility] [phone]      How long have you had these concerns: TODAY

## 2024-07-10 NOTE — TELEPHONE ENCOUNTER
Called and spoke to pt. Informed I reached out to them and all they needed was a new script sent in to [facility]. (I spoke to Aditi)   P:[phone]  F:[phone]

## 2024-07-12 DIAGNOSIS — N39.41 URGE INCONTINENCE: ICD-10-CM

## 2024-07-12 RX ORDER — MIRABEGRON 50 MG/1
50 TABLET, EXTENDED RELEASE ORAL DAILY
Qty: 90 TABLET | Refills: 3 | Status: SHIPPED | OUTPATIENT
Start: 2024-07-12

## 2024-10-30 ENCOUNTER — OFFICE VISIT (OUTPATIENT)
Dept: CARDIOLOGY | Facility: CLINIC | Age: 84
End: 2024-10-30
Payer: MEDICARE

## 2024-10-30 VITALS
DIASTOLIC BLOOD PRESSURE: 85 MMHG | HEART RATE: 73 BPM | BODY MASS INDEX: 22.88 KG/M2 | WEIGHT: 151 LBS | HEIGHT: 68 IN | SYSTOLIC BLOOD PRESSURE: 152 MMHG

## 2024-10-30 DIAGNOSIS — I10 PRIMARY HYPERTENSION: Primary | ICD-10-CM

## 2024-10-30 DIAGNOSIS — E78.2 MIXED HYPERLIPIDEMIA: ICD-10-CM

## 2024-10-30 NOTE — PROGRESS NOTES
Chief Complaint  Hypertension and Follow-up (DOING GOOD)    Subjective        History of Present Illness  Rosalia Gilmore presents to [facility] for follow up.   Patient is a 84-year-old female with past medical history outlined below, significant for hypertension and hyperlipidemia who presents for routine annual follow-up.  She is doing well and has no complaints or concerns today.  She denies chest pain or discomfort, dyspnea, palpitations, edema or syncope.  She checks her blood pressure on a routine basis at home and notes very good blood pressure control.  She presents with a home blood pressure log that demonstrates the same.    Past Medical History:   Diagnosis Date    Chronic GERD 4/21/2023    Essential hypertension 3/28/2022    Hyperlipidemia 3/28/2022    OAB (overactive bladder) 4/21/2023    Vitamin D deficiency 4/21/2023       ALLERGY  Allergies   Allergen Reactions    Penicillins Anaphylaxis    Codeine Itching        History reviewed. No pertinent surgical history.     Social History     Socioeconomic History    Marital status:    Tobacco Use    Smoking status: Never    Smokeless tobacco: Never   Vaping Use    Vaping status: Never Used   Substance and Sexual Activity    Alcohol use: Never    Drug use: Never    Sexual activity: Defer       History reviewed. No pertinent family history.     Current Outpatient Medications on File Prior to Visit   Medication Sig    aspirin 81 MG chewable tablet Chew 1 tablet.    Calcium Carb-Cholecalciferol 250-3.125 MG-MCG tablet Take 1 tablet by mouth.    carvedilol (COREG) 12.5 MG tablet Take 2 tablets by mouth.    cloNIDine (CATAPRES) 0.1 MG tablet Take 1 tablet by mouth 2 (Two) Times a Day.    hydroCHLOROthiazide (HYDRODIURIL) 25 MG tablet     Lancets (freestyle) lancets     metFORMIN ER (GLUCOPHAGE-XR) 500 MG 24 hr tablet Daily.    Mirabegron ER (Myrbetriq) 50 MG tablet sustained-release 24 hour 24 hr tablet Take 50 mg by mouth  "Daily.    omeprazole (priLOSEC) 20 MG capsule Take 1 capsule by mouth Daily.    PRECISION XTRA TEST STRIPS test strip     simvastatin (ZOCOR) 20 MG tablet Take 1 tablet by mouth Every Night.    [DISCONTINUED] traMADol (ULTRAM) 50 MG tablet Take 1 tablet by mouth Every 6 (Six) Hours As Needed.    [DISCONTINUED] celecoxib (CeleBREX) 200 MG capsule Take 1 capsule by mouth Daily.    [DISCONTINUED] losartan (COZAAR) 100 MG tablet Take 1 tablet by mouth Daily.     No current facility-administered medications on file prior to visit.       Objective   Vitals:    10/30/24 1006 10/30/24 1008   BP: 159/84 152/85   Pulse: 72 73   Weight: 68.5 kg (151 lb)    Height: 172.7 cm (68\")        Physical Exam  Constitutional:       General: She is awake. She is not in acute distress.     Appearance: Normal appearance.   HENT:      Head: Normocephalic.      Nose: Nose normal. No congestion.   Eyes:      Extraocular Movements: Extraocular movements intact.      Conjunctiva/sclera: Conjunctivae normal.      Pupils: Pupils are equal, round, and reactive to light.   Neck:      Thyroid: No thyromegaly.      Vascular: No JVD.   Cardiovascular:      Rate and Rhythm: Normal rate and regular rhythm.      Chest Wall: PMI is not displaced.      Pulses: Normal pulses.      Heart sounds: Normal heart sounds, S1 normal and S2 normal. No murmur heard.     No friction rub. No gallop. No S3 or S4 sounds.   Pulmonary:      Effort: Pulmonary effort is normal.      Breath sounds: Normal breath sounds. No wheezing, rhonchi or rales.   Abdominal:      General: Bowel sounds are normal.      Palpations: Abdomen is soft.      Tenderness: There is no abdominal tenderness.   Musculoskeletal:      Cervical back: No tenderness.      Right lower leg: No edema.      Left lower leg: No edema.   Lymphadenopathy:      Cervical: No cervical adenopathy.   Skin:     General: Skin is warm and dry.      Capillary Refill: Capillary refill takes less than 2 seconds.      " Coloration: Skin is not cyanotic.      Findings: No petechiae or rash.      Nails: There is no clubbing.   Neurological:      Mental Status: She is alert.   Psychiatric:         Mood and Affect: Mood normal.         Behavior: Behavior is cooperative.           Result Review     The following data was reviewed by CATY Rodriguez on 10/30/24.                   No results found for this or any previous visit.          Procedures      Assessment & Plan  Primary hypertension  Blood pressure is very well-controlled on the carvedilol and clonidine.  Continue the same.  Continue to monitor at home.  Mixed hyperlipidemia  Continue simvastatin.   Lab Results   Component Value Date    LDL 46 03/25/2022       Follow Up   Return in about 1 year (around 10/30/2025) for With Dr. Juarez.    Patient was given instructions and counseling regarding her condition or for health maintenance advice. Please see specific information pulled into the AVS if appropriate.     CATY Rodriguez  10/30/24  10:35 EDT    Dictated Utilizing Dragon Dictation

## 2025-01-23 ENCOUNTER — OFFICE VISIT (OUTPATIENT)
Dept: UROLOGY | Age: 85
End: 2025-01-23
Payer: MEDICARE

## 2025-01-23 VITALS — RESPIRATION RATE: 14 BRPM | HEIGHT: 68 IN | WEIGHT: 151 LBS | BODY MASS INDEX: 22.88 KG/M2

## 2025-01-23 DIAGNOSIS — N39.41 URGE INCONTINENCE: Primary | ICD-10-CM

## 2025-01-23 DIAGNOSIS — R35.1 NOCTURIA: ICD-10-CM

## 2025-01-23 RX ORDER — MIRABEGRON 50 MG/1
50 TABLET, FILM COATED, EXTENDED RELEASE ORAL DAILY
Qty: 90 TABLET | Refills: 3 | Status: SHIPPED | OUTPATIENT
Start: 2025-01-23

## 2025-01-23 RX ORDER — TROSPIUM CHLORIDE ER 60 MG/1
60 CAPSULE ORAL NIGHTLY
Qty: 90 CAPSULE | Refills: 0 | Status: SHIPPED | OUTPATIENT
Start: 2025-01-23

## 2025-01-23 RX ORDER — CALCIUM CARBONATE/VITAMIN D3 600 MG-10
TABLET ORAL
COMMUNITY
Start: 2025-01-03

## 2025-01-23 RX ORDER — ASPIRIN 81 MG/1
TABLET, COATED ORAL
COMMUNITY
Start: 2024-12-30

## 2025-01-23 NOTE — PROGRESS NOTES
UROLOGY OFFICE FOLLOW UP NOTE    Subjective   HPI  Rosalia Gilmore is a 84 y.o. female.  Presents for evaluation of urinary urgency and frequency.  Long history of overactive bladder refractory to medications.       If she does not urinate when she first gets the urge, she trickles a little before she can get to the bathroom. At night, her urine may be a little larger in volume. During the day if she is up and about, she can make it to the bathroom a little trickle.      She is wearing a pad. She changes her pad twice a day.      She feels like her incontinence is getting worse.      She drinks a couple of cups of coffee a day.   She is on HCTZ in the morning, but her leakage is worse at night. She is most bothered by the leakage when she is getting goingto the bed. She is getting up 2 to 3 times a night.      Her diabetes is well controlled.     Had been on Vesicare for a couple of years. It was doing okay for her for a long time. She started getting real dry mouth. The patient was on Detrol before, as well which stopped working.  Dr. Liu gave her samples of Gemtesa. It seemed like it was working at first, but then after about a month or so, she had dry mouth. She does not remember if she was on Myrbetriq.      Update 8/15/2023: Patient presents for follow-up of urge urinary incontinence, nocturia.  On Myrbetriq 25 mg.  The patient reports that the Myrbetriq is helping. Her urinary symptoms are more controlled during the day than at night because she drinks liquids before going to bed. The patient leaks at night. She denies any side effects from the Myrbetriq.     Update 11/16/23: Patient presents for follow-up of urge urinary incontinence, nocturia.  The patient is doing well. She denies any significant pain or new complaints today.  The patient is taking Myrbetriq. She does not have any leakage when she does not drink a lot of fluids at night. She wants stay on the higher dose and she is not having any  "real side effects from it.    Update 1/23/2025: Patient presents for follow-up of urge urinary incontinence, nocturia.    History of Present Illness   She is currently on Myrbetriq, which she tolerates well and takes daily. She feels that her bladder is fully emptying; however, she continues to experience nocturia, necessitating 2 to 3 bathroom visits per night. She has observed a correlation between her fluid intake and the frequency of nocturnal urination, with fewer episodes on nights when she consumes less water. She expresses a willingness to explore alternative treatment options.  She is also on hydrochlorothiazide, which she administers in the morning.              Review of systems  A review of systems was performed, and positive findings are noted in the HPI.    Objective     Vital Signs:   Resp 14   Ht 172.7 cm (67.99\")   Wt 68.5 kg (151 lb)   BMI 22.96 kg/m²       Physical exam  No acute distress, well-nourished  Awake alert and oriented  Mood normal; affect normal  Physical Exam          Problem List:  Patient Active Problem List   Diagnosis    Essential hypertension    Hyperlipidemia    Type 2 diabetes mellitus with diabetic nephropathy    Syncope and collapse    OAB (overactive bladder)    Chronic GERD    Vitamin D deficiency    Stage 2 chronic kidney disease       Assessment & Plan   Diagnoses and all orders for this visit:    1. Urge incontinence (Primary)  -     Mirabegron ER (Myrbetriq) 50 MG tablet sustained-release 24 hour 24 hr tablet; Take 50 mg by mouth Daily.  Dispense: 90 tablet; Refill: 3    2. Nocturia  -     trospium 60 MG capsule sustained-release 24 hr capsule; Take 1 capsule by mouth Every Night.  Dispense: 90 capsule; Refill: 0        Assessment & Plan    She reports that Myrbetriq has improved her symptoms somewhat but not significantly improved her nighttime urination, despite feeling like she is emptying her bladder completely. She will continue her current Myrbetriq regimen. "     An additional medication will be introduced for nighttime use to help manage her symptoms. The potential side effects of this new medication, including dry mouth and constipation, were discussed. She is advised to monitor her fluid intake, particularly in the evenings, to reduce nighttime urination.    Follow-up  The patient will follow up in 2 to 3 months, PVR.     All questions addressed      Patient or patient representative verbalized consent for the use of Ambient Listening during the visit with  Kenzie Posey MD for chart documentation. 1/23/2025  21:09 EST

## 2025-02-05 ENCOUNTER — TELEPHONE (OUTPATIENT)
Dept: UROLOGY | Age: 85
End: 2025-02-05
Payer: MEDICARE

## 2025-02-05 NOTE — TELEPHONE ENCOUNTER
PT CALLED BACK TO [facility] APPT WITH DR KLINE, SHE WANTED TO LET DR KLINE KNOW THAT  WILL NOT COVER THE MEDICATION THAT DR KLINE PRESCRIBED HER UNTIL SHE TRIES HER ON, DETROL. OXYBUTNIN IR OR TROSPIUM IR FIRST, SHE WOULD LIKE ONE OF THEM CALLED IN TO [facility]. PLEASE LET PT KNOW WHEN THIS IS CALLED IN.

## 2025-02-11 DIAGNOSIS — R35.1 NOCTURIA: ICD-10-CM

## 2025-02-11 DIAGNOSIS — N39.41 URGE INCONTINENCE: Primary | ICD-10-CM

## 2025-02-11 RX ORDER — TOLTERODINE 2 MG/1
2 CAPSULE, EXTENDED RELEASE ORAL DAILY
Qty: 30 CAPSULE | Refills: 0 | Status: SHIPPED | OUTPATIENT
Start: 2025-02-11

## 2025-02-25 ENCOUNTER — TRANSCRIBE ORDERS (OUTPATIENT)
Dept: ADMINISTRATIVE | Facility: HOSPITAL | Age: 85
End: 2025-02-25
Payer: MEDICARE

## 2025-02-25 DIAGNOSIS — Z12.31 SCREENING MAMMOGRAM FOR BREAST CANCER: Primary | ICD-10-CM

## 2025-03-11 ENCOUNTER — HOSPITAL ENCOUNTER (OUTPATIENT)
Dept: MAMMOGRAPHY | Facility: HOSPITAL | Age: 85
Discharge: HOME | End: 2025-03-11
Admitting: INTERNAL MEDICINE
Payer: MEDICARE

## 2025-03-11 DIAGNOSIS — Z12.31 SCREENING MAMMOGRAM FOR BREAST CANCER: ICD-10-CM

## 2025-03-11 PROCEDURE — 77067 SCR MAMMO BI INCL CAD: CPT

## 2025-03-11 PROCEDURE — 77063 BREAST TOMOSYNTHESIS BI: CPT

## 2025-04-03 RX ORDER — OMEPRAZOLE 20 MG/1
TABLET, DELAYED RELEASE ORAL EVERY 24 HOURS
COMMUNITY

## 2025-04-03 RX ORDER — CELECOXIB 200 MG/1
CAPSULE ORAL
COMMUNITY
Start: 2025-02-26

## 2025-04-03 RX ORDER — DIAPER,BRIEF,INFANT-TODD,DISP
1 EACH MISCELLANEOUS DAILY
COMMUNITY

## 2025-04-03 RX ORDER — CARVEDILOL 25 MG/1
TABLET ORAL
COMMUNITY
Start: 2025-02-05

## 2025-04-03 RX ORDER — LYSINE HCL 500 MG
TABLET ORAL EVERY 12 HOURS SCHEDULED
COMMUNITY

## 2025-04-03 RX ORDER — CALCIUM CARBONATE 600 MG
TABLET ORAL EVERY 12 HOURS SCHEDULED
COMMUNITY

## 2025-04-10 ENCOUNTER — OFFICE VISIT (OUTPATIENT)
Dept: UROLOGY | Age: 85
End: 2025-04-10
Payer: MEDICARE

## 2025-04-10 VITALS — HEIGHT: 68 IN | WEIGHT: 151 LBS | BODY MASS INDEX: 22.88 KG/M2

## 2025-04-10 DIAGNOSIS — N39.41 URGE INCONTINENCE: Primary | ICD-10-CM

## 2025-04-10 DIAGNOSIS — R35.1 NOCTURIA: ICD-10-CM

## 2025-04-10 LAB — URINE VOLUME: NORMAL

## 2025-04-10 RX ORDER — NITROFURANTOIN 25; 75 MG/1; MG/1
100 CAPSULE ORAL 2 TIMES DAILY
Qty: 6 CAPSULE | Refills: 0 | Status: SHIPPED | OUTPATIENT
Start: 2025-04-10

## 2025-04-10 RX ORDER — TROSPIUM CHLORIDE 20 MG/1
20 TABLET, FILM COATED ORAL 2 TIMES DAILY
Qty: 60 TABLET | Refills: 0 | Status: SHIPPED | OUTPATIENT
Start: 2025-04-10

## 2025-04-10 NOTE — PROGRESS NOTES
UROLOGY OFFICE FOLLOW UP NOTE    Subjective   HPI  Rosalia Gilmore is a 84 y.o. female. Presents for evaluation of urinary urgency and frequency.  Long history of overactive bladder refractory to medications.       If she does not urinate when she first gets the urge, she trickles a little before she can get to the bathroom. At night, her urine may be a little larger in volume. During the day if she is up and about, she can make it to the bathroom a little trickle.      She is wearing a pad. She changes her pad twice a day.      She feels like her incontinence is getting worse.      She drinks a couple of cups of coffee a day.   She is on HCTZ in the morning, but her leakage is worse at night. She is most bothered by the leakage when she is getting goingto the bed. She is getting up 2 to 3 times a night.      Her diabetes is well controlled.     Had been on Vesicare for a couple of years. It was doing okay for her for a long time. She started getting real dry mouth. The patient was on Detrol before, as well which stopped working.  Dr. Liu gave her samples of Gemtesa. It seemed like it was working at first, but then after about a month or so, she had dry mouth. She does not remember if she was on Myrbetriq.      Update 8/15/2023: Patient presents for follow-up of urge urinary incontinence, nocturia.  On Myrbetriq 25 mg.  The patient reports that the Myrbetriq is helping. Her urinary symptoms are more controlled during the day than at night because she drinks liquids before going to bed. The patient leaks at night. She denies any side effects from the Myrbetriq.     Update 11/16/23: Patient presents for follow-up of urge urinary incontinence, nocturia.  The patient is doing well. She denies any significant pain or new complaints today.  The patient is taking Myrbetriq. She does not have any leakage when she does not drink a lot of fluids at night. She wants stay on the higher dose and she is not having any  "real side effects from it.     Update 1/23/2025: Patient presents for follow-up of urge urinary incontinence, nocturia.    History of Present Illness   She is currently on Myrbetriq, which she tolerates well and takes daily. She feels that her bladder is fully emptying; however, she continues to experience nocturia, necessitating 2 to 3 bathroom visits per night. She has observed a correlation between her fluid intake and the frequency of nocturnal urination, with fewer episodes on nights when she consumes less water. She expresses a willingness to explore alternative treatment options.  She is also on hydrochlorothiazide, which she administers in the morning.     Update 4/10/2025: Patient presents for follow-up of urge urinary incontinence, nocturia.  Trial in combination with Myrbetriq and Detrol LA 2 mg.  History of Present Illness  She has been experiencing persistent symptoms of urinary incontinence, despite being on a regimen of Myrbetriq and tolterodine.   She reports no significant improvement in her condition with the current medication. She experiences occasional daytime incontinence, particularly when she does not immediately respond to the urge to urinate. She also reports nocturnal incontinence, necessitating the use of 2 to 3 pads or briefs per night. She reports no adverse effects from the medications. She is currently on the maximum dose of Myrbetriq and has an adequate supply of the medication.        Results for orders placed or performed in visit on 04/10/25   Bladder Scan    Collection Time: 04/10/25  1:22 PM   Result Value Ref Range    Urine Volume 0ml          Review of systems  A review of systems was performed, and positive findings are noted in the HPI.    Objective     Vital Signs:   Ht 172.7 cm (67.99\")   Wt 68.5 kg (151 lb)   BMI 22.97 kg/m²       Physical exam  No acute distress, well-nourished  Awake alert and oriented  Mood normal; affect normal  Physical Exam          Bladder Scan " interpretation 04/10/2025    Estimation of residual urine via [employer] [place] Bladder Scan  Performed by: EMMA Torres  Residual Urine: 0 ml  Indication: Urge incontinence    Nocturia   Position: Supine  Examination: Incremental scanning of the suprapubic area using 2.0 MHz transducer using copious amounts of acoustic gel.   Findings: An anechoic area was demonstrated which represented the bladder, with measurement of residual urine as noted. I inspected this myself. In that the residual urine was stable or insignificant, refer to plan for treatment and plan necessary at this time.     Problem List:  Patient Active Problem List   Diagnosis    Essential hypertension    Hyperlipidemia    Type 2 diabetes mellitus with diabetic nephropathy    Syncope and collapse    OAB (overactive bladder)    Chronic GERD    Vitamin D deficiency    Stage 2 chronic kidney disease       Assessment & Plan   Diagnoses and all orders for this visit:    1. Urge incontinence (Primary)  -     Bladder Scan  -     nitrofurantoin, macrocrystal-monohydrate, (Macrobid) 100 MG capsule; Take 1 capsule by mouth 2 (Two) Times a Day. Start day prior to urologic procedure and take until completed.  Dispense: 6 capsule; Refill: 0  -     trospium (SANCTURA) 20 MG tablet; Take 1 tablet by mouth 2 (Two) Times a Day.  Dispense: 60 tablet; Refill: 0  -     Cystoscopy; Future    2. Nocturia  -     Cystoscopy; Future      Patient emptying bladder without postvoid residual  Assessment & Plan    She reports no improvement in symptoms despite combination management, taking tolterodine with Myrbetriq.   She experiences leakage if she does not immediately respond to the urge to urinate and gets up 2 to 3 times at night.     A cystoscopy will be scheduled to assess her bladder anatomy and tissues.     Trial of different medication.  She will continue Myrbetriq and start trospium 20 mg immediate release, to be taken twice daily, once in the morning with Myrbetriq and once  at night.  Side effects discussed including worsening constipation and dry mouth.    A prophylactic antibiotic will be prescribed to be taken the day before the cystoscopy.     Schedule cystoscopy  All questions addressed      Patient or patient representative verbalized consent for the use of Ambient Listening during the visit with  Kenzie Posey MD for chart documentation. 4/12/2025  12:10 EDT

## 2025-04-15 ENCOUNTER — OFFICE VISIT (OUTPATIENT)
Dept: ORTHOPEDIC SURGERY | Facility: CLINIC | Age: 85
End: 2025-04-15
Payer: MEDICARE

## 2025-04-15 VITALS
OXYGEN SATURATION: 97 % | SYSTOLIC BLOOD PRESSURE: 176 MMHG | BODY MASS INDEX: 23.34 KG/M2 | DIASTOLIC BLOOD PRESSURE: 91 MMHG | WEIGHT: 154 LBS | HEIGHT: 68 IN

## 2025-04-15 DIAGNOSIS — M17.0 OSTEOARTHRITIS OF BOTH KNEES, UNSPECIFIED OSTEOARTHRITIS TYPE: ICD-10-CM

## 2025-04-15 DIAGNOSIS — M25.562 PAIN IN BOTH KNEES, UNSPECIFIED CHRONICITY: Primary | ICD-10-CM

## 2025-04-15 DIAGNOSIS — M25.561 PAIN IN BOTH KNEES, UNSPECIFIED CHRONICITY: Primary | ICD-10-CM

## 2025-04-15 NOTE — PROGRESS NOTES
"Chief Complaint  Initial Evaluation of the Right Knee and Initial Evaluation of the Left Knee     Óscar Gilmore presents to [facility] for initial evaluation of bilateral knees.  She is having pain in bilateral knees.  The right knee is worse.  She has had pain for months.  She has had no injury or fall.  She has had injections, anti inflammatories and exercises in the past.  She is using topical cream that gives some relief.  She uses Tylenol also.  She notes Celebrex gave no relief so she has discontinued that.    Allergies   Allergen Reactions    Penicillins Anaphylaxis    Codeine Itching        Social History     Socioeconomic History    Marital status:    Tobacco Use    Smoking status: Never    Smokeless tobacco: Never   Vaping Use    Vaping status: Never Used   Substance and Sexual Activity    Alcohol use: Not Currently    Drug use: Never    Sexual activity: Defer        I reviewed the patient's chief complaint, history of present illness, review of systems, past medical history, surgical history, family history, social history, medications, and allergy list.     Review of Systems     Constitutional: Denies fevers, chills, weight loss  Cardiovascular: Denies chest pain, shortness of breath  Skin: Denies rashes, acute skin changes  Neurologic: Denies headache, loss of consciousness        Vital Signs:   /91   Ht 172.7 cm (67.99\")   Wt 69.9 kg (154 lb)   SpO2 97%   BMI 23.42 kg/m²          Physical Exam  General: Alert. No acute distress    Ortho Exam        BILATERAL KNEES Flexion 110. Extension -3. Stable to varus/valgus stress. Stable to anterior/posterior drawer. Neurovascularly intact. Negative Tuyet. Negative Lachman. Positive EHL, FHL, HS and TA. Sensation intact to light touch all 5 nerves of the foot. Ambulates with Antalgic gait. Patella is well tracking. Calf supple, non-tender. Positive tenderness to the medial joint line. " Positive tenderness to the lateral joint line. Positive Crepitus. Good strength to hamstrings, quadriceps, dorsiflexors, and plantar flexors.  Knee Extensor Mechanism intact Mild swelling.         Large Joint Arthrocentesis: R knee  Date/Time: 4/15/2025 2:24 PM  Consent given by: patient  Site marked: site marked  Timeout: Immediately prior to procedure a time out was called to verify the correct patient, procedure, equipment, support staff and site/side marked as required   Supporting Documentation  Indications: pain   Procedure Details  Location: knee - R knee  Needle gauge: 21 G.  Medications administered: 48 mg hylan 48 MG/6ML  Patient tolerance: patient tolerated the procedure well with no immediate complications        This injection documentation was Scribed for Esteban Wolf MD by Tee Ferrara.  04/15/25   14:24 EDT      Imaging Results (Most Recent)       Procedure Component Value Units Date/Time    XR Knee 3 View Right [[id number]] Resulted: 04/15/25 1354     Updated: 04/15/25 1403    XR Knee 3 View Left [[id number]] Resulted: 04/15/25 1354     Updated: 04/15/25 1403             Result Review :     X-Ray Report:  Right knee X-Ray  Indication: Evaluation of the right knee  AP/Lateral and [place] view(s)  Findings: Moderately advanced degenerative changes with near bone on bone arthritis.    Prior studies available for comparison: no     X-Ray Report:  Left knee X-Ray  Indication: Evaluation of the left knee  AP/Lateral and [place] view(s)  Findings: Moderately advanced degenerative changes with near bone on bone arthritis.    Prior studies available for comparison: no             Assessment and Plan     Diagnoses and all orders for this visit:    1. Pain in both knees, unspecified chronicity (Primary)  -     XR Knee 3 View Right  -     XR Knee 3 View Left    2. Osteoarthritis of both knees, unspecified osteoarthritis type  -     Large Joint Arthrocentesis: R knee        Discussed the treatment plan with the  patient. I reviewed the X-rays that were obtained today with the patient.     Discussed the risks and benefits of conservative measures. The patient expressed understanding and wished to proceed with a right knee Synvisc injection.  She tolerated the injection well.     Discussed possibility of a reaction from the injection.  Discussed the possibility that the injection may not completely improve or remove the pain.  Discussed the risk of infection.        Call or return if worsening symptoms.    Follow Up     2 weeks to discuss a left knee Synvisc injection.        Patient was given instructions and counseling regarding her condition or for health maintenance advice. Please see specific information pulled into the AVS if appropriate.     Scribed for Esteban Wolf MD by Fiorella Eubanks MA.  04/15/25   14:11 EDT    I have personally performed the services described in this document as scribed by the above individual and it is both accurate and complete. Esteban Wolf MD 04/15/25

## 2025-05-01 ENCOUNTER — OFFICE VISIT (OUTPATIENT)
Dept: ORTHOPEDIC SURGERY | Facility: CLINIC | Age: 85
End: 2025-05-01
Payer: MEDICARE

## 2025-05-01 VITALS
DIASTOLIC BLOOD PRESSURE: 93 MMHG | BODY MASS INDEX: 23.36 KG/M2 | SYSTOLIC BLOOD PRESSURE: 145 MMHG | HEIGHT: 68 IN | WEIGHT: 154.1 LBS | HEART RATE: 73 BPM | OXYGEN SATURATION: 96 %

## 2025-05-01 DIAGNOSIS — M25.562 LEFT KNEE PAIN, UNSPECIFIED CHRONICITY: ICD-10-CM

## 2025-05-01 DIAGNOSIS — M17.0 OSTEOARTHRITIS OF BOTH KNEES, UNSPECIFIED OSTEOARTHRITIS TYPE: Primary | ICD-10-CM

## 2025-05-01 DIAGNOSIS — M25.561 RIGHT KNEE PAIN, UNSPECIFIED CHRONICITY: ICD-10-CM

## 2025-05-01 NOTE — PROGRESS NOTES
Chief Complaint  No chief complaint on file.     Subjective        History of Present Illness  The patient is an 84-year-old female who presents today to follow up on bilateral knee pain. She had an initial evaluation with Dr. Wolf on 04/15/2025, during which x-rays showed moderately advanced degenerative changes with near bone-on-bone arthritis in both knees, with the right knee being worse than the left. A Synvisc injection was administered to the right knee at that visit, and she is here today to discuss a Synvisc injection for the left knee.    The Synvisc injection administered to the right knee during the previous visit has been effective. Previous treatments have included injections, anti-inflammatories, physical therapy, and topical creams. Celebrex was discontinued due to lack of efficacy, and Tylenol is currently used for pain management.  She is interested in left knee Synvisc injection today.      Allergies   Allergen Reactions    Penicillins Anaphylaxis    Codeine Itching        Social History     Socioeconomic History    Marital status:    Tobacco Use    Smoking status: Never    Smokeless tobacco: Never   Vaping Use    Vaping status: Never Used   Substance and Sexual Activity    Alcohol use: Not Currently    Drug use: Never    Sexual activity: Defer        Tobacco Use: Low Risk  (4/15/2025)    Patient History     Smoking Tobacco Use: Never     Smokeless Tobacco Use: Never     Passive Exposure: Not on file     Patient reports that they are a nonsmoker; cessation education not applicable.     I reviewed the patient's chief complaint, history of present illness, review of systems, past medical history, surgical history, family history, social history, medications, and allergy list.     Review of Systems     Constitutional: Denies fevers, chills, weight loss  Cardiovascular: Denies chest pain, shortness of breath  Skin: Denies rashes, acute skin changes  Neurologic: Denies headache, loss of  consciousness    Vital Signs:   There were no vitals taken for this visit.         Physical Exam  General: Alert. No acute distress    Ortho Exam    General: Alert, no acute distress  Left lower extremity: Mild to moderate knee effusion.  Positive tenderness to the medial joint line.  No tenderness to the lateral joint line. Left knee ROM -3-110 degrees.  Knee extensor mechanism intact. Knee stable to varus and valgus stress. Calf soft, nontender. Demonstrates active ankle plantarflexion and dorsiflexion. Sensation intact over the dorsal and plantar foot.  Palpable pedal pulses.      Physical Exam      Large Joint Arthrocentesis: L knee  Date/Time: 5/1/2025 1:22 PM  Consent given by: patient  Site marked: site marked  Timeout: Immediately prior to procedure a time out was called to verify the correct patient, procedure, equipment, support staff and site/side marked as required   Supporting Documentation  Indications: pain   Procedure Details  Location: knee - L knee  Preparation: Patient was prepped and draped in the usual sterile fashion  Needle gauge: 21 G.  Approach: lateral  Medications administered: 48 mg hylan 48 MG/6ML  Patient tolerance: patient tolerated the procedure well with no immediate complications    This injection documentation was Scribed for Shruthi Turner PA-C by Karma Coronado.  05/01/25   13:23 EDT    \  Result Review :       XR Knee 3 View Left  Result Date: 4/15/2025  Narrative: X-Ray Report: Left knee X-Ray Indication: Evaluation of the left knee AP/Lateral and [place] view(s) Findings: Moderately advanced degenerative changes with near bone on bone arthritis.  Prior studies available for comparison: no      XR Knee 3 View Right  Result Date: 4/15/2025  Narrative: X-Ray Report: Right knee X-Ray Indication: Evaluation of the right knee AP/Lateral and [place] view(s) Findings: Moderately advanced degenerative changes with near bone on bone arthritis.  Prior studies available for  comparison: no               Assessment and Plan     Diagnoses and all orders for this visit:    1. Osteoarthritis of both knees, unspecified osteoarthritis type (Primary)    2. Right knee pain, unspecified chronicity    3. Left knee pain, unspecified chronicity      Patient received synvisc injection today in office in left knee  and tolerated the procedure well without complication.  Counseled that these injections can be given every 6 months and 1 day with insurance approval. Pt can also receive steroid injections intermittently between these doses, with at least 6 weeks between the two.  Steroid injections can be given every 3 months.    Discussed the risk, benefits and alternatives of injection.  Discussed potential complications such as increased pain, swelling and tenderness at the injection site.  Possibility of reaction.  Possibility that injection may not improve pain. Risk of infection.  Possibility of worsening pain after injection.  Patient verbalized understanding and gives verbal consent to proceed.     Follow up 6 weeks for efficacy of gel injection/consider steroid injections.  She is not sure if she has ever had steroid injections before  Call with questions, concerns, or worsening symptoms.    Assessment & Plan      Patient or patient representative verbalized consent for the use of Ambient Listening during the visit with  Shruthi Turner PA-C for chart documentation. 5/1/2025  14:01 EDT    Follow Up   There are no Patient Instructions on file for this visit.        Patient was given instructions and counseling regarding her condition or for health maintenance advice. Please see specific information pulled into the AVS if appropriate.     Shruthi Turner PA-C   05/01/2025  12:53 EDT        Dictated Utilizing Dragon Dictation. Please note that portions of this note were completed with a voice recognition program. Part of this note may be an electronic transcription/translation of spoken  language to printed text using the Dragon Dictation System.

## 2025-05-07 ENCOUNTER — PROCEDURE VISIT (OUTPATIENT)
Dept: UROLOGY | Age: 85
End: 2025-05-07
Payer: MEDICARE

## 2025-05-07 ENCOUNTER — PREP FOR SURGERY (OUTPATIENT)
Dept: OTHER | Facility: HOSPITAL | Age: 85
End: 2025-05-07
Payer: MEDICARE

## 2025-05-07 DIAGNOSIS — R35.1 NOCTURIA: ICD-10-CM

## 2025-05-07 DIAGNOSIS — N39.41 URGE INCONTINENCE: Primary | ICD-10-CM

## 2025-05-07 DIAGNOSIS — R30.0 DYSURIA: ICD-10-CM

## 2025-05-07 DIAGNOSIS — N36.42 INTRINSIC SPHINCTER DEFICIENCY: ICD-10-CM

## 2025-05-07 DIAGNOSIS — N32.9 LESION OF BLADDER: Primary | ICD-10-CM

## 2025-05-07 LAB
BILIRUB BLD-MCNC: NEGATIVE MG/DL
CLARITY, POC: CLEAR
COLOR UR: YELLOW
EXPIRATION DATE: NORMAL
GLUCOSE UR STRIP-MCNC: NEGATIVE MG/DL
KETONES UR QL: NEGATIVE
LEUKOCYTE EST, POC: NEGATIVE
Lab: NORMAL
NITRITE UR-MCNC: NEGATIVE MG/ML
PH UR: 7 [PH] (ref 5–8)
PROT UR STRIP-MCNC: NEGATIVE MG/DL
RBC # UR STRIP: NEGATIVE /UL
SP GR UR: 1.02 (ref 1–1.03)
UROBILINOGEN UR QL: NORMAL

## 2025-05-07 NOTE — PROGRESS NOTES
Preprocedure diagnosis  Urge incontinence, nocturia    Postprocedure diagnosis  Urge incontinence, nocturia, intrinsic sphincter deficiency    Procedure  Flexible Cystourethroscopy    Attending surgeon  Kenzie Posey MD    Anesthesia  2% lidocaine jelly intraurethrally    Complications  None    Indications  84 y.o. female undergoing a flexible cystoscopy for the above mentioned indications.  Presents for lower urinary tract evaluation given refractory symptoms.  Patient denies discomfort or hematuria.  UA performed, unremarkable at today's visit.  Informed consent was obtained.      Findings  Severe vaginal atrophy, patent urethra without abnormality; slight leak while supine with sauce elbow.    Cystoscopy revealed diffuse cystitis cystica with some benign-appearing bladder polyp, left bladder base; patchy erythema with increased hypervascularity particularly at posterior wall; no discrete bladder tumors; no masses; no papillary lesions    Procedure  The patient was placed in supine position and prepped and draped in sterile fashion with lidocaine jelly per urethra for anesthesia.  A timeout was performed.  The 14F flexible cystoscope was lubricated and gently placed through the urethra and into the bladder.  The bladder was completely visualized.  The cystoscope was retroflexed and the bladder neck visualized. Findings were as above. The scope was withdrawn and the procedure terminated.  The patient tolerated the procedure well.        Plan:  Tolerated procedure well.  Instructions provided.  Cystoscopic findings discussed with patient include severe atrophy, findings consistent with intrinsic sphincter deficiency; chronic inflammation of the bladder including cystitis cystica; also some discrete hypervascularity with patchy erythema at posterior bladder wall.  Discussed this is of unknown significance; given UA without concern for infection, recommend proceeding with pathologic diagnosis via cystoscopy,  bladder biopsy to ensure no malignancy.  Risk, benefits, and alternatives of this procedure were discussed with her at length.  Risks including but not limited to bleeding, infection, damage to surrounding structure, pain, need for further procedures or management, and risk of anesthesia.  Patient notes understanding, agreeable to proceed.    Further management of her incontinence complaints depending upon additional workup.  She notes understanding of this as well    Schedule OR  All questions addressed

## 2025-05-14 ENCOUNTER — TELEPHONE (OUTPATIENT)
Dept: UROLOGY | Age: 85
End: 2025-05-14
Payer: MEDICARE

## 2025-05-14 DIAGNOSIS — N32.9 LESION OF BLADDER: Primary | ICD-10-CM

## 2025-05-14 DIAGNOSIS — R30.0 DYSURIA: ICD-10-CM

## 2025-05-14 DIAGNOSIS — Z01.818 PRE-OP TESTING: ICD-10-CM

## 2025-05-14 NOTE — TELEPHONE ENCOUNTER
Call made to pt to get scheduled for surgery; pt offered 6/2 and is agreeable; RN advised will need UCX week before so to get on 5/26; sending clearance to dr márquez to stop taking asa 3 days prior to surgery; RN confirmed not on any other anit-coags or dm/wl medications/injections; RN states will be sending brochure on instructions and will get a call Friday before from PAT and timing of surgery; no further questions

## 2025-05-15 ENCOUNTER — TELEPHONE (OUTPATIENT)
Dept: CARDIOLOGY | Facility: CLINIC | Age: 85
End: 2025-05-15
Payer: MEDICARE

## 2025-05-15 NOTE — TELEPHONE ENCOUNTER
----- Message from Soco Mahan sent at 5/15/2025  9:08 AM EDT -----    ----- Message -----  From: Melissa Galeano MA  Sent: 5/14/2025   4:24 PM EDT  To: Blaine Juarez MD

## 2025-05-15 NOTE — TELEPHONE ENCOUNTER
Procedure:CYSTOSCOPY BLADDER BIOPSY WITH FULGURATION     Medication Directive: Aspirin 3 days prior    PMH: HTN, HLD    Last Seen: 10/30/24

## 2025-05-15 NOTE — TELEPHONE ENCOUNTER
The patient has undergone cardiovascular evaluation from a cardiac standpoint only. Patient is low risk and is ok to proceed.ok to hold aspirin for 5-7 days prior to the procedure.

## 2025-05-27 ENCOUNTER — LAB (OUTPATIENT)
Facility: HOSPITAL | Age: 85
End: 2025-05-27
Payer: MEDICARE

## 2025-05-27 DIAGNOSIS — R30.0 DYSURIA: ICD-10-CM

## 2025-05-27 DIAGNOSIS — N32.9 LESION OF BLADDER: ICD-10-CM

## 2025-05-27 DIAGNOSIS — Z01.818 PRE-OP TESTING: ICD-10-CM

## 2025-05-27 PROCEDURE — 87086 URINE CULTURE/COLONY COUNT: CPT

## 2025-05-28 LAB — BACTERIA SPEC AEROBE CULT: NO GROWTH

## 2025-05-29 NOTE — PRE-PROCEDURE INSTRUCTIONS
PATIENT INSTRUCTED TO BE:    - NOTHING TO EAT, DRINK OR CHEW AFTER MIDNIGHT      - TO HOLD ALL VITAMINS, SUPPLEMENTS, NSAIDS FOR ONE WEEK PRIOR TO THEIR SURGICAL PROCEDURE    HOLD ASPIRIN 3 DAYS PRIOR TO PROCEDURE PER DR. KLINE    TAKE METFORMIN NO LATER THAN 6 PM THE EVENING PRIOR TO PROCEDURE    - INSTRUCTED PT TO USE SURGICAL SOAP 1 TIME THE NIGHT PRIOR TO SURGERY ___6/1________ OR THE AM OF SURGERY _____6/2________   USE THE SOAP FROM NECK TO TOES, AVOID THEIR FACE, HAIR, AND PRIVATE PARTS. IF USE THE SOAP THE NIGHT PRIOR TO SURGERY, CHANGE BED LINENS AND NO PETS IN THE BED.     INSTRUCTED NO LOTIONS, JEWELRY, PIERCINGS,  NAIL POLISH, OR DEODORANT DAY OF SURGERY    - IF DIABETIC, CHECK BLOOD GLUCOSE IF LESS THAN 70 OR HAVING SYMPTOMS CALL THE PREOP AREA FOR INSTRUCTIONS ON AM OF SURGERY ([phone])    -INSTRUCTED TO TAKE THE FOLLOWING MEDICATIONS THE DAY OF SURGERY WITH SIPS OF WATER: CLONIDINE, OMEPRAZOLE, MYRBETRIQ    - DO NOT BRING ANY MEDICATIONS WITH YOU TO THE HOSPITAL THE DAY OF SURGERY, EXCEPT IF USE INHALERS. BRING INHALERS DAY OF SURGERY       - BRING CPAP OR BIPAP TO THE HOSPITAL ONLY IF YOU ARE SPENDING THE NIGHT    - DO NOT SMOKE OR VAPE 24 HOURS PRIOR TO PROCEDURE PER ANESTHESIA REQUEST     -MAKE SURE YOU HAVE A RIDE HOME OR SOMEONE TO STAY WITH YOU THE DAY OF THE PROCEDURE AFTER YOU GO HOME     - FOLLOW ANY OTHER INSTRUCTIONS GIVEN TO YOU BY YOUR SURGEON'S OFFICE.     - DAY OF SURGERY __6/2, MAIN ENTRANCE [facility]. TAKE ELEVATOR TO FIRST FLOOR, TURN LEFT AND CHECK IN WITH REGISTRATION    - YOU WILL RECEIVE A PHONE CALL THE DAY PRIOR TO SURGERY BETWEEN 1PM AND 4 PM WITH ARRIVAL TIME, IF YOUR SURGERY IS ON A MONDAY YOU WILL RECEIVE A CALL THE FRIDAY PRIOR TO SURGERY DATE    - BRING CASH OR CREDIT CARD FOR COPAYMENT OF MEDICATIONS AFTER SURGERY IF YOU USE THE HOSPITAL PHARMACY (MEDS TO BED)    - PREADMISSION TESTING NURSE JERZY OAKES [phone] IF HAVE ANY QUESTIONS     -PATIENT  PROVIDED THE NUMBER FOR PREOP SURGICAL DEPT IF HAD QUESTIONS AFTER HOURS PRIOR TO SURGERY ([phone] ).  INFORMED PT IF NO ANSWER, LEAVE A MESSAGE AND SOMEONE WILL RETURN THEIR CALL       PATIENT VERBALIZED UNDERSTANDING

## 2025-06-02 ENCOUNTER — ANESTHESIA (OUTPATIENT)
Dept: PERIOP | Facility: HOSPITAL | Age: 85
End: 2025-06-02
Payer: MEDICARE

## 2025-06-02 ENCOUNTER — ANESTHESIA EVENT (OUTPATIENT)
Dept: PERIOP | Facility: HOSPITAL | Age: 85
End: 2025-06-02
Payer: MEDICARE

## 2025-06-02 ENCOUNTER — HOSPITAL ENCOUNTER (OUTPATIENT)
Facility: HOSPITAL | Age: 85
Setting detail: HOSPITAL OUTPATIENT SURGERY
Discharge: HOME | End: 2025-06-02
Attending: UROLOGY | Admitting: UROLOGY
Payer: MEDICARE

## 2025-06-02 VITALS
DIASTOLIC BLOOD PRESSURE: 98 MMHG | HEART RATE: 75 BPM | HEIGHT: 68 IN | TEMPERATURE: 97.7 F | OXYGEN SATURATION: 99 % | SYSTOLIC BLOOD PRESSURE: 195 MMHG | WEIGHT: 147.93 LBS | RESPIRATION RATE: 16 BRPM | BODY MASS INDEX: 22.42 KG/M2

## 2025-06-02 DIAGNOSIS — N32.9 LESION OF BLADDER: ICD-10-CM

## 2025-06-02 LAB
GLUCOSE BLDC GLUCOMTR-MCNC: 126 MG/DL (ref 70–99)
GLUCOSE BLDC GLUCOMTR-MCNC: 127 MG/DL (ref 70–99)

## 2025-06-02 PROCEDURE — 52224 CYSTOSCOPY AND TREATMENT: CPT | Performed by: UROLOGY

## 2025-06-02 PROCEDURE — 25010000002 LEVOFLOXACIN PER 250 MG: Performed by: UROLOGY

## 2025-06-02 PROCEDURE — 82948 REAGENT STRIP/BLOOD GLUCOSE: CPT | Performed by: NURSE ANESTHETIST, CERTIFIED REGISTERED

## 2025-06-02 PROCEDURE — 82948 REAGENT STRIP/BLOOD GLUCOSE: CPT | Performed by: ANESTHESIOLOGY

## 2025-06-02 PROCEDURE — 88305 TISSUE EXAM BY PATHOLOGIST: CPT | Performed by: UROLOGY

## 2025-06-02 PROCEDURE — 25010000002 DEXAMETHASONE PER 1 MG: Performed by: NURSE ANESTHETIST, CERTIFIED REGISTERED

## 2025-06-02 PROCEDURE — S0260 H&P FOR SURGERY: HCPCS | Performed by: UROLOGY

## 2025-06-02 PROCEDURE — 25010000002 PROPOFOL 10 MG/ML EMULSION: Performed by: NURSE ANESTHETIST, CERTIFIED REGISTERED

## 2025-06-02 PROCEDURE — 25810000003 LACTATED RINGERS PER 1000 ML: Performed by: ANESTHESIOLOGY

## 2025-06-02 PROCEDURE — 25010000002 ONDANSETRON PER 1 MG: Performed by: NURSE ANESTHETIST, CERTIFIED REGISTERED

## 2025-06-02 PROCEDURE — 25010000002 SUGAMMADEX 200 MG/2ML SOLUTION: Performed by: NURSE ANESTHETIST, CERTIFIED REGISTERED

## 2025-06-02 PROCEDURE — 25010000002 LIDOCAINE PF 2% 2 % SOLUTION: Performed by: NURSE ANESTHETIST, CERTIFIED REGISTERED

## 2025-06-02 RX ORDER — ACETAMINOPHEN 500 MG
500 TABLET ORAL ONCE
Status: COMPLETED | OUTPATIENT
Start: 2025-06-02 | End: 2025-06-02

## 2025-06-02 RX ORDER — IBUPROFEN 600 MG/1
600 TABLET, FILM COATED ORAL EVERY 6 HOURS PRN
Status: DISCONTINUED | OUTPATIENT
Start: 2025-06-02 | End: 2025-06-02 | Stop reason: HOSPADM

## 2025-06-02 RX ORDER — ROCURONIUM BROMIDE 10 MG/ML
INJECTION, SOLUTION INTRAVENOUS AS NEEDED
Status: DISCONTINUED | OUTPATIENT
Start: 2025-06-02 | End: 2025-06-02 | Stop reason: SURG

## 2025-06-02 RX ORDER — LEVOFLOXACIN 5 MG/ML
500 INJECTION, SOLUTION INTRAVENOUS EVERY 24 HOURS
Status: DISCONTINUED | OUTPATIENT
Start: 2025-06-02 | End: 2025-06-02 | Stop reason: HOSPADM

## 2025-06-02 RX ORDER — PROMETHAZINE HYDROCHLORIDE 25 MG/1
25 TABLET ORAL ONCE AS NEEDED
Status: DISCONTINUED | OUTPATIENT
Start: 2025-06-02 | End: 2025-06-02 | Stop reason: HOSPADM

## 2025-06-02 RX ORDER — LIDOCAINE HYDROCHLORIDE 20 MG/ML
INJECTION, SOLUTION EPIDURAL; INFILTRATION; INTRACAUDAL; PERINEURAL AS NEEDED
Status: DISCONTINUED | OUTPATIENT
Start: 2025-06-02 | End: 2025-06-02 | Stop reason: SURG

## 2025-06-02 RX ORDER — ONDANSETRON 4 MG/1
4 TABLET, ORALLY DISINTEGRATING ORAL ONCE AS NEEDED
Status: DISCONTINUED | OUTPATIENT
Start: 2025-06-02 | End: 2025-06-02 | Stop reason: HOSPADM

## 2025-06-02 RX ORDER — ONDANSETRON 2 MG/ML
INJECTION INTRAMUSCULAR; INTRAVENOUS AS NEEDED
Status: DISCONTINUED | OUTPATIENT
Start: 2025-06-02 | End: 2025-06-02 | Stop reason: SURG

## 2025-06-02 RX ORDER — PHENAZOPYRIDINE HYDROCHLORIDE 200 MG/1
200 TABLET, FILM COATED ORAL 3 TIMES DAILY PRN
Qty: 14 TABLET | Refills: 0 | Status: SHIPPED | OUTPATIENT
Start: 2025-06-02 | End: 2025-06-11

## 2025-06-02 RX ORDER — OXYCODONE HYDROCHLORIDE 5 MG/1
5 TABLET ORAL
Refills: 0 | Status: DISCONTINUED | OUTPATIENT
Start: 2025-06-02 | End: 2025-06-02 | Stop reason: HOSPADM

## 2025-06-02 RX ORDER — DEXAMETHASONE SODIUM PHOSPHATE 4 MG/ML
INJECTION, SOLUTION INTRA-ARTICULAR; INTRALESIONAL; INTRAMUSCULAR; INTRAVENOUS; SOFT TISSUE AS NEEDED
Status: DISCONTINUED | OUTPATIENT
Start: 2025-06-02 | End: 2025-06-02 | Stop reason: SURG

## 2025-06-02 RX ORDER — HYDROCODONE BITARTRATE AND ACETAMINOPHEN 5; 325 MG/1; MG/1
.5-1 TABLET ORAL EVERY 6 HOURS PRN
Qty: 12 TABLET | Refills: 0 | Status: SHIPPED | OUTPATIENT
Start: 2025-06-02

## 2025-06-02 RX ORDER — PROMETHAZINE HYDROCHLORIDE 12.5 MG/1
12.5 TABLET ORAL ONCE AS NEEDED
Status: DISCONTINUED | OUTPATIENT
Start: 2025-06-02 | End: 2025-06-02 | Stop reason: HOSPADM

## 2025-06-02 RX ORDER — PROPOFOL 10 MG/ML
VIAL (ML) INTRAVENOUS AS NEEDED
Status: DISCONTINUED | OUTPATIENT
Start: 2025-06-02 | End: 2025-06-02 | Stop reason: SURG

## 2025-06-02 RX ORDER — PROMETHAZINE HYDROCHLORIDE 25 MG/1
25 SUPPOSITORY RECTAL ONCE AS NEEDED
Status: DISCONTINUED | OUTPATIENT
Start: 2025-06-02 | End: 2025-06-02 | Stop reason: HOSPADM

## 2025-06-02 RX ORDER — SODIUM CHLORIDE, SODIUM LACTATE, POTASSIUM CHLORIDE, CALCIUM CHLORIDE 600; 310; 30; 20 MG/100ML; MG/100ML; MG/100ML; MG/100ML
9 INJECTION, SOLUTION INTRAVENOUS CONTINUOUS PRN
Status: DISCONTINUED | OUTPATIENT
Start: 2025-06-02 | End: 2025-06-02 | Stop reason: HOSPADM

## 2025-06-02 RX ORDER — ASPIRIN 81 MG/1
81 TABLET, COATED ORAL DAILY
Start: 2025-06-05

## 2025-06-02 RX ORDER — ACETAMINOPHEN 325 MG/1
650 TABLET ORAL ONCE
Status: DISCONTINUED | OUTPATIENT
Start: 2025-06-02 | End: 2025-06-02 | Stop reason: HOSPADM

## 2025-06-02 RX ORDER — ONDANSETRON 2 MG/ML
4 INJECTION INTRAMUSCULAR; INTRAVENOUS ONCE AS NEEDED
Status: DISCONTINUED | OUTPATIENT
Start: 2025-06-02 | End: 2025-06-02 | Stop reason: HOSPADM

## 2025-06-02 RX ORDER — OXYBUTYNIN CHLORIDE 5 MG/1
5 TABLET ORAL 3 TIMES DAILY PRN
Qty: 12 TABLET | Refills: 0 | Status: SHIPPED | OUTPATIENT
Start: 2025-06-02 | End: 2025-06-11

## 2025-06-02 RX ADMIN — ROCURONIUM BROMIDE 50 MG: 10 INJECTION, SOLUTION INTRAVENOUS at 07:28

## 2025-06-02 RX ADMIN — LEVOFLOXACIN 500 MG: 5 INJECTION, SOLUTION INTRAVENOUS at 07:33

## 2025-06-02 RX ADMIN — SUGAMMADEX 200 MG: 100 INJECTION, SOLUTION INTRAVENOUS at 08:05

## 2025-06-02 RX ADMIN — ACETAMINOPHEN 500 MG: 500 TABLET ORAL at 07:06

## 2025-06-02 RX ADMIN — ONDANSETRON 4 MG: 2 INJECTION INTRAMUSCULAR; INTRAVENOUS at 07:28

## 2025-06-02 RX ADMIN — PROPOFOL 90 MG: 10 INJECTION, EMULSION INTRAVENOUS at 07:27

## 2025-06-02 RX ADMIN — DEXAMETHASONE SODIUM PHOSPHATE 4 MG: 4 INJECTION, SOLUTION INTRAMUSCULAR; INTRAVENOUS at 07:28

## 2025-06-02 RX ADMIN — SODIUM CHLORIDE, POTASSIUM CHLORIDE, SODIUM LACTATE AND CALCIUM CHLORIDE 9 ML/HR: 600; 310; 30; 20 INJECTION, SOLUTION INTRAVENOUS at 07:07

## 2025-06-02 RX ADMIN — LIDOCAINE HYDROCHLORIDE 60 MG: 20 INJECTION, SOLUTION INTRAVENOUS at 07:27

## 2025-06-02 NOTE — H&P
[facility]   Urology Preop H&P Note    Patient Name: Rosalia Gilmore  : 1940  MRN: [id number]  Primary Care Physician:  Chriss Liu MD  Referring Physician: Kenzie Posey MD  Date of admission: 2025    Subjective   Subjective     Reason for Consult/ Chief Complaint: Lesion of bladder [N32.9]    HPI:  Rosalia Gilmore is a 84 y.o. female history ofLesion of bladder [N32.9] who presents for further management OR.  Presents for planned Procedure(s):  CYSTOSCOPY BLADDER BIOPSY WITH FULGURATION  Plain text: Scope bladder, biopsy;  .    Risk, benefits, and alternatives discussed with patient prior to today.All questions were addressed after providing time for discussion.  Patient denies significant changes since last visit.  No new complaints today.    Review of Systems   All systems were reviewed and negative except for the above  Personal History     Past Medical History:   Diagnosis Date    Chronic GERD 2023    Diabetes mellitus     Essential hypertension 2022    Hyperlipidemia 2022    OAB (overactive bladder) 2023    Urinary incontinence     Vitamin D deficiency 2023       Past Surgical History:   Procedure Laterality Date    CATARACT EXTRACTION Bilateral     HYSTERECTOMY         Family History: family history includes Hypertension in her mother. Otherwise pertinent FHx was reviewed and not pertinent to current issue.    Social History:  reports that she has never smoked. She has never used smokeless tobacco. She reports that she does not currently use alcohol. She reports that she does not use drugs.    Home Medications:  Calcium 600+D Plus Minerals, Mirabegron ER, aspirin, carvedilol, cloNIDine, freestyle, glucose blood, hydroCHLOROthiazide, metFORMIN ER, omeprazole, and simvastatin    Allergies:  Allergies   Allergen Reactions    Penicillins Anaphylaxis    Codeine Itching       Objective    Objective     Vitals:   Temp:  [97.4 °F (36.3 °C)] 97.4 °F (36.3  °C)  Heart Rate:  [80] 80  Resp:  [22] 22  BP: (181)/(95) 181/95    Physical Exam:   Constitutional: Awake, alert   Respiratory: Clear, nonlabored respirations    Cardiovascular: Regular rate, no chest retractions   gastrointestinal: Appears soft, nontender     Results:    Assessment & Plan   Assessment / Plan     Brief Patient Summary:  Rosalia Gilmore is a 84 y.o. female who     Active Hospital Problems:  Active Hospital Problems    Diagnosis     **Lesion of bladder        Plan:   Proceed to the OR for planned procedure, Procedure(s):  CYSTOSCOPY BLADDER BIOPSY WITH FULGURATION  Plain text: Scope bladder, biopsy,  ,   Risk, benefits, and alternatives discussed with patient at length she is agreeable to proceed  All questions addressed      Electronically signed by Kenzie Posey MD, 06/02/25, 6:00 AM EDT.

## 2025-06-02 NOTE — ANESTHESIA POSTPROCEDURE EVALUATION
Patient: Rosalia Gilmore    Procedure Summary       Date: 06/02/25 Room / Location: [facility] OR 08 / [facility] MAIN OR    Anesthesia Start: 0723 Anesthesia Stop: 0823    Procedure: CYSTOSCOPY BLADDER BIOPSY WITH FULGURATION  Plain text: Scope bladder, biopsy Diagnosis:       Lesion of bladder      (Lesion of bladder [N32.9])    Surgeons: Kenzie Posey MD Provider: Danielle Hyde MD    Anesthesia Type: general ASA Status: 2            Anesthesia Type: general    Vitals  Vitals Value Taken Time   /92 06/02/25 08:48   Temp 36.2 °C (97.1 °F) 06/02/25 08:40   Pulse 69 06/02/25 08:51   Resp 13 06/02/25 08:45   SpO2 99 % 06/02/25 08:51   Vitals shown include unfiled device data.        Post Anesthesia Care and Evaluation    Patient location during evaluation: bedside  Patient participation: complete - patient participated  Level of consciousness: awake  Pain management: adequate    Airway patency: patent  PONV Status: none  Cardiovascular status: acceptable and stable  Respiratory status: acceptable  Hydration status: acceptable

## 2025-06-02 NOTE — OP NOTE
Preoperative diagnosis  Bladder lesion    Postoperative diagnosis  Bladder lesion    Procedure performed  Cystoscopy, bladder biopsy and fulguration (minor 0.5 mm)    Attending surgeon  Kenzie Posey MD     Anesthesia  General    EBL  0  mL    Complications  None    Specimen  Posterior bladder biopsy    Findings  Severe vaginal atrophy, patent urethra without abnormality;    Cystoscopy revealed diffuse cystitis cystica with some tiny rotund 1-2mm bladder polyp, left bladder base; erythema with increased hypervascularity at posterior wall biopsied; no discrete bladder tumors; no masses; no papillary lesions    Indications  84 y.o. female agreed to undergo the above named procedure after discussion of the alternatives, risks and benefits.   Informed consent was obtained.      Procedure  After proper consent was obtained, patient was taken back to the operating room and MAC anesthesia was performed.  Patient was placed in the dorsal lithotomy position and prepped and draped in the normal sterile fashion for cystoscopy.       A 22 Mexican rigid cystoscope was inserted into the bladder.  The bladder was inspected in a systematic meridian fashion using a 30 and 70 degree lens.  There were no tumors, lesions, stones or other abnormalities seen except for except for at the posterior wall where there was increased erythema and hypervascularity.  No overlying mucosal changes appreciated.  Both ureteral orifices were in normal appearance, tiny 1-2 mm bladder polyp noted unchanged from previous evaluation.       Biopsies were taken of the posterior wall lesion using biopsy forceps.  The area was then fulgurated with [place].  This area was approximately0.5 cm in size.  Once hemostasis noted be excellent, the bladder was emptied and the cystoscope removed.  The procedure was then deemed terminated.  Patient was awoken from anesthesia and transferred to PACU in good condition      Signed:  Kenzie Posey MD  06/02/25  08:11  EDT

## 2025-06-02 NOTE — ANESTHESIA PREPROCEDURE EVALUATION
Anesthesia Evaluation     Patient summary reviewed and Nursing notes reviewed   no history of anesthetic complications:   NPO Solid Status: > 8 hours  NPO Liquid Status: > 2 hours           Airway   Mallampati: II  TM distance: >3 FB  Neck ROM: full  Dental - normal exam     Pulmonary - negative pulmonary ROS and normal exam   Cardiovascular - normal exam  Exercise tolerance: good (4-7 METS)    (+) hypertension, hyperlipidemia      Neuro/Psych  (+) syncope  GI/Hepatic/Renal/Endo    (+) GERD, renal disease- CRI, diabetes mellitus    Musculoskeletal (-) negative ROS    Abdominal  - normal exam   Substance History - negative use     OB/GYN negative ob/gyn ROS         Other - negative ROS       ROS/Med Hx Other: EF= 55%                    Anesthesia Plan    ASA 2     general     intravenous induction     Anesthetic plan, risks, benefits, and alternatives have been provided, discussed and informed consent has been obtained with: patient.    Plan discussed with CRNA.        CODE STATUS:

## 2025-06-03 LAB
CYTO UR: NORMAL
LAB AP CASE REPORT: NORMAL
LAB AP CLINICAL INFORMATION: NORMAL
PATH REPORT.FINAL DX SPEC: NORMAL
PATH REPORT.GROSS SPEC: NORMAL

## 2025-06-11 ENCOUNTER — OFFICE VISIT (OUTPATIENT)
Dept: UROLOGY | Age: 85
End: 2025-06-11
Payer: MEDICARE

## 2025-06-11 VITALS — BODY MASS INDEX: 22.28 KG/M2 | WEIGHT: 147 LBS | HEIGHT: 68 IN

## 2025-06-11 DIAGNOSIS — N30.90 CYSTITIS: ICD-10-CM

## 2025-06-11 DIAGNOSIS — R35.1 NOCTURIA: ICD-10-CM

## 2025-06-11 DIAGNOSIS — N32.9 LESION OF BLADDER: Primary | ICD-10-CM

## 2025-06-11 DIAGNOSIS — N39.41 URGE INCONTINENCE: ICD-10-CM

## 2025-06-11 RX ORDER — SOLIFENACIN SUCCINATE 5 MG/1
5 TABLET, FILM COATED ORAL DAILY
Qty: 90 TABLET | Refills: 1 | Status: SHIPPED | OUTPATIENT
Start: 2025-06-11

## 2025-06-11 NOTE — PROGRESS NOTES
UROLOGY OFFICE FOLLOW UP NOTE    Subjective   HPI  Rosalia Gilmore is a 84 y.o. female. Presents for evaluation of urinary urgency and frequency.  Long history of overactive bladder refractory to medications.       If she does not urinate when she first gets the urge, she trickles a little before she can get to the bathroom. At night, her urine may be a little larger in volume. During the day if she is up and about, she can make it to the bathroom a little trickle.      She is wearing a pad. She changes her pad twice a day.      She feels like her incontinence is getting worse.      She drinks a couple of cups of coffee a day.   She is on HCTZ in the morning, but her leakage is worse at night. She is most bothered by the leakage when she is getting goingto the bed. She is getting up 2 to 3 times a night.      Her diabetes is well controlled.     Had been on Vesicare for a couple of years. It was doing okay for her for a long time. She started getting real dry mouth. The patient was on Detrol before, as well which stopped working.  Dr. Liu gave her samples of Gemtesa. It seemed like it was working at first, but then after about a month or so, she had dry mouth. She does not remember if she was on Myrbetriq.      Update 8/15/2023: Patient presents for follow-up of urge urinary incontinence, nocturia.  On Myrbetriq 25 mg.  The patient reports that the Myrbetriq is helping. Her urinary symptoms are more controlled during the day than at night because she drinks liquids before going to bed. The patient leaks at night. She denies any side effects from the Myrbetriq.     Update 11/16/23: Patient presents for follow-up of urge urinary incontinence, nocturia.  The patient is doing well. She denies any significant pain or new complaints today.  The patient is taking Myrbetriq. She does not have any leakage when she does not drink a lot of fluids at night. She wants stay on the higher dose and she is not having any  real side effects from it.     Update 1/23/2025: Patient presents for follow-up of urge urinary incontinence, nocturia.    History of Present Illness   She is currently on Myrbetriq, which she tolerates well and takes daily. She feels that her bladder is fully emptying; however, she continues to experience nocturia, necessitating 2 to 3 bathroom visits per night. She has observed a correlation between her fluid intake and the frequency of nocturnal urination, with fewer episodes on nights when she consumes less water. She expresses a willingness to explore alternative treatment options.  She is also on hydrochlorothiazide, which she administers in the morning.     Update 4/10/2025: Patient presents for follow-up of urge urinary incontinence, nocturia.  Trial in combination with Myrbetriq and Detrol LA 2 mg.  History of Present Illness  She has been experiencing persistent symptoms of urinary incontinence, despite being on a regimen of Myrbetriq and tolterodine.   She reports no significant improvement in her condition with the current medication. She experiences occasional daytime incontinence, particularly when she does not immediately respond to the urge to urinate. She also reports nocturnal incontinence, necessitating the use of 2 to 3 pads or briefs per night. She reports no adverse effects from the medications. She is currently on the maximum dose of Myrbetriq and has an adequate supply of the medication.       Update 6/11/2025: Presents for follow-up bladder lesion after bladder biopsy.  History of refractory urge urinary incontinence.  History of Present Illness  No significant changes of urinary symptoms.  No issues after biopsy.  She reports that her urinary incontinence is most bothersome during the daytime.     She has been managing her condition with Myrbetriq, taken in the morning, and expresses a preference to continue this treatment; does not wish to consider procedural management at this time.. She  "has previously tried other medications including solifenacin.      _______  Cystoscopy bladder biopsy 6/2/2025; pathology; chronic cystitis              Review of systems  A review of systems was performed, and positive findings are noted in the HPI.    Objective     Vital Signs:   Ht 172.7 cm (68\")   Wt 66.7 kg (147 lb)   BMI 22.35 kg/m²       Physical exam  No acute distress, well-nourished  Awake alert and oriented  Mood normal; affect normal  Physical Exam      Problem List:  Patient Active Problem List   Diagnosis    Essential hypertension    Hyperlipidemia    Type 2 diabetes mellitus with diabetic nephropathy    Syncope and collapse    OAB (overactive bladder)    Chronic GERD    Vitamin D deficiency    Stage 2 chronic kidney disease    Lesion of bladder       Assessment & Plan   Diagnoses and all orders for this visit:    1. Lesion of bladder (Primary)    2. Cystitis    3. Urge incontinence  -     solifenacin (VESICARE) 5 MG tablet; Take 1 tablet by mouth Daily.  Dispense: 90 tablet; Refill: 1    4. Nocturia      Operative findings and biopsy pathology result reviewed with patient.    Negative for malignancy.  Assessment & Plan    The patient's biopsies returned negative for malignancy, but they did indicate inflammation within the bladder.     Various pharmacological interventions have been attempted without significant success to manage her refractory urge incontinence.     Fortunately, she does not have recurrent urinary tract infections. The potential benefits and risks of Botox injections were discussed, including the possibility of difficulty in bladder emptying post-procedure, which could necessitate catheterization.   Posterior tibial nerve stimulation was also considered as a noninvasive option, involving weekly nurse visits for 12 weeks.   The InterStim device was another potential intervention discussed.     She expressed a preference to continue with her current medication regimen, Myrbetriq, and " declined any procedural interventions at this time.     Follow-up combination of medication management.  A prescription for Vesicare 10 mg, to be taken concurrently with Myrbetriq in the morning, was provided.     Her bladder emptying will be evaluated during her next visit to ensure effective emptying while on these two medications.    Follow-up  The patient will follow up in 6 months PVR       All questions addressed      Patient or patient representative verbalized consent for the use of Ambient Listening during the visit with  Kenzie Posey MD for chart documentation. 6/11/2025  17:53 EDT

## 2025-07-03 ENCOUNTER — OFFICE VISIT (OUTPATIENT)
Dept: ORTHOPEDIC SURGERY | Facility: CLINIC | Age: 85
End: 2025-07-03
Payer: MEDICARE

## 2025-07-03 VITALS
OXYGEN SATURATION: 92 % | SYSTOLIC BLOOD PRESSURE: 159 MMHG | BODY MASS INDEX: 22.29 KG/M2 | HEIGHT: 68 IN | DIASTOLIC BLOOD PRESSURE: 78 MMHG | HEART RATE: 71 BPM | WEIGHT: 147.05 LBS

## 2025-07-03 DIAGNOSIS — M17.0 OSTEOARTHRITIS OF BOTH KNEES, UNSPECIFIED OSTEOARTHRITIS TYPE: Primary | ICD-10-CM

## 2025-07-03 NOTE — PROGRESS NOTES
"Chief Complaint  Follow-up of the Left Knee     Subjective      History of Present Illness  The patient is here for a follow-up on bilateral knee osteoarthritis. She had her initial evaluation with Dr. Wolf on 04/15/2025. X-rays showed moderately advanced osteoarthritis with near bone-on-bone contact in both knees, the right being worse than the left. She received a Synvisc injection in her right knee during her visit with Dr. Wolf on 04/15/2025 and another in her left knee on 05/01/2025.     She reports an improvement in her knee condition, attributing it to the effectiveness of the gel injections. She does not feel the need for additional injections at this time. Her mobility has improved, and she experiences no pain during knee movement.      Allergies   Allergen Reactions    Penicillins Anaphylaxis    Codeine Itching        Social History     Socioeconomic History    Marital status:    Tobacco Use    Smoking status: Never    Smokeless tobacco: Never   Vaping Use    Vaping status: Never Used   Substance and Sexual Activity    Alcohol use: Not Currently    Drug use: Never    Sexual activity: Defer        Tobacco Use: Low Risk  (7/3/2025)    Patient History     Smoking Tobacco Use: Never     Smokeless Tobacco Use: Never     Passive Exposure: Not on file     Patient reports that they are a nonsmoker; cessation education not applicable.     I reviewed the patient's chief complaint, history of present illness, review of systems, past medical history, surgical history, family history, social history, medications, and allergy list.     Review of Systems     Constitutional: Denies fevers, chills, weight loss  Cardiovascular: Denies chest pain, shortness of breath  Skin: Denies rashes, acute skin changes  Neurologic: Denies headache, loss of consciousness    Vital Signs:   /78   Pulse 71   Ht 172.7 cm (68\")   Wt 66.7 kg (147 lb 0.8 oz)   SpO2 92%   BMI 22.36 kg/m²          Physical Exam  General: " Alert. No acute distress    Ortho Exam    Bilateral knee: Knee extensor mechanism intact with knee stable to varus and valgus stress.  Range of motion extension lacks a couple degrees, flexion 115 degrees.  Physical Exam            Assessment and Plan     Diagnoses and all orders for this visit:    1. Osteoarthritis of both knees, unspecified osteoarthritis type (Primary)        Assessment & Plan  1. Bilateral knee osteoarthritis:  The patient reports significant improvement in knee pain and mobility following Synvisc injections. She does not require further injections at this time.    The next Synvisc injection for the right knee is scheduled for 10/16/2025 and for the left knee on 11/02/2025. If pain occurs before these dates, she can contact the office to arrange for a steroid injection. It was discussed that steroid injections could temporarily elevate blood sugar levels if she is diabetic. She was informed that the gel injections can only be administered every six months and one day. If she remains pain-free, she does not need to return for the injections in October or November.     Follow-up as needed for future injections.    Patient or patient representative verbalized consent for the use of Ambient Listening during the visit with  Shruthi Turner PA-C for chart documentation. 7/3/2025  14:16 EDT    Follow Up   There are no Patient Instructions on file for this visit.        Patient was given instructions and counseling regarding her condition or for health maintenance advice. Please see specific information pulled into the AVS if appropriate.     Shruthi Turner PA-C   07/03/2025  14:15 EDT        Dictated Utilizing Dragon Dictation. Please note that portions of this note were completed with a voice recognition program. Part of this note may be an electronic transcription/translation of spoken language to printed text using the Dragon Dictation System.

## (undated) DEVICE — SOL IRRG H2O BG 3000ML STRL

## (undated) DEVICE — Device

## (undated) DEVICE — PAD GRND REM POLYHESIVE A/ DISP

## (undated) DEVICE — TUBING, SUCTION, 1/4" X 10', STRAIGHT: Brand: MEDLINE

## (undated) DEVICE — GLOVE,SURG,SENSICARE SLT,LF,PF,7: Brand: MEDLINE

## (undated) DEVICE — GOWN,SIRUS,POLYRNF,BRTHSLV,XLN/XXL,18/CS: Brand: MEDLINE

## (undated) DEVICE — SET, IRRIGATION CYSTO, Y-TYPE, 81": Brand: MEDLINE

## (undated) DEVICE — SKIN PREP TRAY W/CHG: Brand: MEDLINE INDUSTRIES, INC.

## (undated) DEVICE — ELECTRD FULGURATING BUGBEE 5F58CM

## (undated) DEVICE — THE STERILE LIGHT HANDLE COVER IS USED WITH STERIS SURGICAL LIGHTING AND VISUALIZATION SYSTEMS.

## (undated) DEVICE — CYSTO PACK: Brand: MEDLINE INDUSTRIES, INC.

## (undated) DEVICE — DRSNG TELFA PAD NONADH STR 1S 3X4IN

## (undated) DEVICE — CORD ENDO BOVIE 1P/U 10FT